# Patient Record
Sex: FEMALE | Race: WHITE | NOT HISPANIC OR LATINO | Employment: OTHER | URBAN - METROPOLITAN AREA
[De-identification: names, ages, dates, MRNs, and addresses within clinical notes are randomized per-mention and may not be internally consistent; named-entity substitution may affect disease eponyms.]

---

## 2017-11-16 ENCOUNTER — CONVERSION ENCOUNTER (OUTPATIENT)
Dept: RADIOLOGY | Facility: IMAGING CENTER | Age: 77
End: 2017-11-16

## 2018-06-26 ENCOUNTER — TRANSCRIBE ORDERS (OUTPATIENT)
Dept: PHYSICAL THERAPY | Facility: CLINIC | Age: 78
End: 2018-06-26

## 2018-06-26 ENCOUNTER — EVALUATION (OUTPATIENT)
Dept: PHYSICAL THERAPY | Facility: CLINIC | Age: 78
End: 2018-06-26
Payer: MEDICARE

## 2018-06-26 DIAGNOSIS — M54.9 DORSALGIA: Primary | ICD-10-CM

## 2018-06-26 PROCEDURE — G8978 MOBILITY CURRENT STATUS: HCPCS | Performed by: PHYSICAL THERAPIST

## 2018-06-26 PROCEDURE — 97014 ELECTRIC STIMULATION THERAPY: CPT | Performed by: PHYSICAL THERAPIST

## 2018-06-26 PROCEDURE — 97163 PT EVAL HIGH COMPLEX 45 MIN: CPT | Performed by: PHYSICAL THERAPIST

## 2018-06-26 PROCEDURE — G8979 MOBILITY GOAL STATUS: HCPCS | Performed by: PHYSICAL THERAPIST

## 2018-06-26 NOTE — LETTER
2018    Nora Zuleta MD  [address] [address]    Patient: David Urrutia   YOB: 1940   Date of Visit: 2018     Encounter Diagnosis     ICD-10-CM    1  Dorsalgia M54 9        Dear Dr Chayo Godinez:    Please review the attached Plan of Care from [facility] recent visit  Please verify that you agree therapy should continue by signing the attached document and sending it back to our office  If you have any questions or concerns, please don't hesitate to call  Sincerely,    Helena Arcos, PT      Referring Provider:      I certify that I have read the below Plan of Care and certify the need for these services furnished under this plan of treatment while under my care  Nora Zuleta MD  [facility] [address] [id number]  VIA Facsimile: [phone]          PT Evaluation     Today's date: 2018  Patient name: David Urrutia  : 1940  MRN: [id number]  Referring provider: Glenna Bennett MD  Dx:   Encounter Diagnosis     ICD-10-CM    1  Dorsalgia M54 9                   Assessment  Impairments: abnormal gait, abnormal muscle firing, abnormal movement, activity intolerance, impaired physical strength, pain with function, safety issue, poor posture  and poor body mechanics    Assessment details: David Urrutia was seen for an initial PT evaluation today  Patient is a [de-identified] y o  female with diagnosis of lumbar pain and past medical history significant for cardiac dysfunction, pneumonia, low bone density  High complexity evaluation  due to number of participation restrictions, functional outcome measure of 66% disability, and unstable/worsening clinical presentation  Findings today show limited lumbar AROM with decreased LE strength and stability as well as limited core strength with pain impacting overall functional mobility and ability to perform ADLs  Skilled PT indicated to treat at this time       Barriers to therapy: Pain  Understanding of Dx/Px/POC: good   Prognosis: good    Goals  STG  1  Patient's lumbar pain will be reported 3/10 pain at rest    2  Improve patient's lumbar flexion to distal knee  for increased ability to take proper strides during ambulation  3  Increase patient's ability to transfer in and OOB with proper log roll mechanics and 50% VC  LTG  1  Patient's LE strength will be equal bilaterally for ability to ambulate and return to functional activities at PLOF  2  Patient will be able to transfer in and OOB with 0/10 pain in lumbar for increased ability to transfer  3  Patient will be independent with home exercise program for continued maintenance post PT discharge  Plan  Patient would benefit from: skilled physical therapy  Planned modality interventions: electrical stimulation/Russian stimulation, cryotherapy, ultrasound and thermotherapy: hydrocollator packs  Planned therapy interventions: abdominal trunk stabilization, activity modification, balance, body mechanics training, manual therapy, patient education, strengthening, stretching, therapeutic activities, therapeutic exercise, transfer training, gait training, flexibility, functional ROM exercises and home exercise program  Frequency: 3x week  Duration in weeks: 8  Plan of Care beginning date: 2018  Plan of Care expiration date: 2018  Treatment plan discussed with: patient        Subjective Evaluation    History of Present Illness  Date of onset: 2018  Mechanism of injury: Patient stated 18 she felt sick with congestion and cough  Went to PCP and was given antibiotics for pneumonia  6 days later she had increased coughing and felt a "crack" in her back  Attempted to sleep that night and had extreme difficulty getting OOB  Went to ED and was admitted for 5 days with Dx of bronchitis  Spinal x-rays (-) for fx  Recently saw PCP who referred her to PT    Not a recurrent problem   Quality of life: good    Pain  Current pain ratin  At best pain ratin  At worst pain rating: 10  Location: LB  Quality: discomfort, cramping, pulling and tight  Relieving factors: relaxation, rest and support  Aggravating factors: sitting, standing, walking, stair climbing and lifting  Progression: worsening    Social Support  Stairs in house: yes     Employment status: not working    Diagnostic Tests  X-ray: normal  Treatments  Previous treatment: home therapy and medication  Current treatment: medication  Discharged from (in last 30 days): inpatient hospitalization and home health care  Patient Goals  Patient goals for therapy: increased motion, decreased pain and independence with ADLs/IADLs          Objective     Special Questions  Positive for disturbed sleep  Postural Observations  Seated posture: fair  Standing posture: fair  Correction of posture: has no consistent effect    Additional Postural Observation Details  Sway back, posterior weight shift    Palpation   Left   No palpable tenderness to the external abdominal oblique  Muscle spasm in the erector spinae, lumbar paraspinals and quadratus lumborum  Tenderness of the erector spinae, lumbar paraspinals and quadratus lumborum  Right   No palpable tenderness to the external abdominal oblique  Muscle spasm in the erector spinae, lumbar paraspinals and quadratus lumborum  Tenderness of the erector spinae, lumbar paraspinals and quadratus lumborum  Tenderness     Left Hip   Tenderness in the PSIS  Right Hip   Tenderness in the PSIS  Neurological Testing     Sensation     Lumbar   Left   Intact: light touch    Right   Intact: light touch    Active Range of Motion     Lumbar   Flexion: Active lumbar flexion: to proximal knee  Extension: 12 degrees   Left lateral flexion: 13 degrees   Right lateral flexion: 14 degrees     Additional Active Range of Motion Details  Repetitive lumbar flexion increase pain, repetitive lumbar extension decrease/stabilized pain       Strength/Myotome Testing Left Hip   Planes of Motion   Flexion: 4    Right Hip   Planes of Motion   Flexion: 4-    Left Knee   Flexion: 4+  Extension: 4+    Right Knee   Flexion: 4  Extension: 4+    Left Ankle/Foot   Dorsiflexion: 4    Right Ankle/Foot   Dorsiflexion: 4    Muscle Activation     Additional Muscle Activation Details  Min limited TrA activation bilateral with resisted supine SLR  Tests     Lumbar   Positive repeated flexion  Negative repeated extension  Additional Tests Details  90/90 SLR hamstring tightness right=(43deg) left=(38deg)    Ambulation   Weight-Bearing Status   Weight-Bearing Status (Left): full weight bearing   Distance in feet: 500  Assistive device used: none    Ambulation: Level Surfaces   Ambulation without assistive device: independent    Observational Gait   Gait: antalgic and asymmetric   Decreased walking speed and stride length  Left arm swing: decreased  Right arm swing: decreased    Additional Observational Gait Details  Lumbar spine kept in slight extension with sway back posture  Decreased velocity with short step length, shuffle gait  Quality of Movement During Gait   Trunk  Posterior lean  General Comments     Lumbar Comments  FOTO Lumbar Functional Status 66% disability         Flowsheet Rows      Most Recent Value   PT/OT G-Codes   FOTO information reviewed  N/A [FOTO lumbar functional status short form 66% disability]   Assessment Type  Evaluation   G code set  Mobility: Walking & Moving Around   Mobility: Walking and Moving Around Current Status ()  CL   Mobility: Walking and Moving Around Goal Status ()  CK          Precautions:     Re-evaluation: 7/26/18    Specialty Daily Treatment Diary     Manual  6/26/18       STM/DTM                                            Exercise Diary  6/26/18       Sit <> supine mechanics 10 min       Core brace        Supine ball squeeze        Supine clamshell        LTR        Hamstring stretch        Seated QL stretch Modalities 6/26/18       Seated lumbar IFC 10 min with heat

## 2018-06-26 NOTE — PROGRESS NOTES
PT Evaluation     Today's date: 2018  Patient name: Joanna Gonzales  : 1940  MRN: [id number]  Referring provider: Kumar Guillen MD  Dx:   Encounter Diagnosis     ICD-10-CM    1  Dorsalgia M54 9                   Assessment  Impairments: abnormal gait, abnormal muscle firing, abnormal movement, activity intolerance, impaired physical strength, pain with function, safety issue, poor posture  and poor body mechanics    Assessment details: Joanna Gonzales was seen for an initial PT evaluation today  Patient is a [de-identified] y o  female with diagnosis of lumbar pain and past medical history significant for cardiac dysfunction, pneumonia, low bone density  High complexity evaluation  due to number of participation restrictions, functional outcome measure of 66% disability, and unstable/worsening clinical presentation  Findings today show limited lumbar AROM with decreased LE strength and stability as well as limited core strength with pain impacting overall functional mobility and ability to perform ADLs  Skilled PT indicated to treat at this time  Barriers to therapy: Pain  Understanding of Dx/Px/POC: good   Prognosis: good    Goals  STG  1  Patient's lumbar pain will be reported 3/10 pain at rest    2  Improve patient's lumbar flexion to distal knee  for increased ability to take proper strides during ambulation  3  Increase patient's ability to transfer in and OOB with proper log roll mechanics and 50% VC  LTG  1  Patient's LE strength will be equal bilaterally for ability to ambulate and return to functional activities at PLOF  2  Patient will be able to transfer in and OOB with 0/10 pain in lumbar for increased ability to transfer  3  Patient will be independent with home exercise program for continued maintenance post PT discharge         Plan  Patient would benefit from: skilled physical therapy  Planned modality interventions: electrical stimulation/Russian stimulation, cryotherapy, ultrasound and thermotherapy: hydrocollator packs  Planned therapy interventions: abdominal trunk stabilization, activity modification, balance, body mechanics training, manual therapy, patient education, strengthening, stretching, therapeutic activities, therapeutic exercise, transfer training, gait training, flexibility, functional ROM exercises and home exercise program  Frequency: 3x week  Duration in weeks: 8  Plan of Care beginning date: 2018  Plan of Care expiration date: 2018  Treatment plan discussed with: patient        Subjective Evaluation    History of Present Illness  Date of onset: 2018  Mechanism of injury: Patient stated 18 she felt sick with congestion and cough  Went to PCP and was given antibiotics for pneumonia  6 days later she had increased coughing and felt a "crack" in her back  Attempted to sleep that night and had extreme difficulty getting OOB  Went to ED and was admitted for 5 days with Dx of bronchitis  Spinal x-rays (-) for fx  Recently saw PCP who referred her to PT  Not a recurrent problem   Quality of life: good    Pain  Current pain ratin  At best pain ratin  At worst pain rating: 10  Location: LB  Quality: discomfort, cramping, pulling and tight  Relieving factors: relaxation, rest and support  Aggravating factors: sitting, standing, walking, stair climbing and lifting  Progression: worsening    Social Support  Stairs in house: yes     Employment status: not working    Diagnostic Tests  X-ray: normal  Treatments  Previous treatment: home therapy and medication  Current treatment: medication  Discharged from (in last 30 days): inpatient hospitalization and home health care  Patient Goals  Patient goals for therapy: increased motion, decreased pain and independence with ADLs/IADLs          Objective     Special Questions  Positive for disturbed sleep       Postural Observations  Seated posture: fair  Standing posture: fair  Correction of posture: has no consistent effect    Additional Postural Observation Details  Sway back, posterior weight shift    Palpation   Left   No palpable tenderness to the external abdominal oblique  Muscle spasm in the erector spinae, lumbar paraspinals and quadratus lumborum  Tenderness of the erector spinae, lumbar paraspinals and quadratus lumborum  Right   No palpable tenderness to the external abdominal oblique  Muscle spasm in the erector spinae, lumbar paraspinals and quadratus lumborum  Tenderness of the erector spinae, lumbar paraspinals and quadratus lumborum  Tenderness     Left Hip   Tenderness in the PSIS  Right Hip   Tenderness in the PSIS  Neurological Testing     Sensation     Lumbar   Left   Intact: light touch    Right   Intact: light touch    Active Range of Motion     Lumbar   Flexion: Active lumbar flexion: to proximal knee  Extension: 12 degrees   Left lateral flexion: 13 degrees   Right lateral flexion: 14 degrees     Additional Active Range of Motion Details  Repetitive lumbar flexion increase pain, repetitive lumbar extension decrease/stabilized pain  Strength/Myotome Testing     Left Hip   Planes of Motion   Flexion: 4    Right Hip   Planes of Motion   Flexion: 4-    Left Knee   Flexion: 4+  Extension: 4+    Right Knee   Flexion: 4  Extension: 4+    Left Ankle/Foot   Dorsiflexion: 4    Right Ankle/Foot   Dorsiflexion: 4    Muscle Activation     Additional Muscle Activation Details  Min limited TrA activation bilateral with resisted supine SLR  Tests     Lumbar   Positive repeated flexion  Negative repeated extension       Additional Tests Details  90/90 SLR hamstring tightness right=(43deg) left=(38deg)    Ambulation   Weight-Bearing Status   Weight-Bearing Status (Left): full weight bearing   Distance in feet: 500  Assistive device used: none    Ambulation: Level Surfaces   Ambulation without assistive device: independent    Observational Gait   Gait: antalgic and asymmetric   Decreased walking speed and stride length  Left arm swing: decreased  Right arm swing: decreased    Additional Observational Gait Details  Lumbar spine kept in slight extension with sway back posture  Decreased velocity with short step length, shuffle gait  Quality of Movement During Gait   Trunk  Posterior lean  General Comments     Lumbar Comments  FOTO Lumbar Functional Status 66% disability         Flowsheet Rows      Most Recent Value   PT/OT G-Codes   FOTO information reviewed  N/A [FOTO lumbar functional status short form 66% disability]   Assessment Type  Evaluation   G code set  Mobility: Walking & Moving Around   Mobility: Walking and Moving Around Current Status ()  CL   Mobility: Walking and Moving Around Goal Status ()  CK          Precautions:     Re-evaluation: 7/26/18    Specialty Daily Treatment Diary     Manual  6/26/18       STM/DTM                                            Exercise Diary  6/26/18       Sit <> supine mechanics 10 min       Core brace        Supine ball squeeze        Supine clamshell        LTR        Hamstring stretch        Seated QL stretch                                                                                                                    Modalities 6/26/18       Seated lumbar IFC 10 min with heat

## 2018-06-28 ENCOUNTER — OFFICE VISIT (OUTPATIENT)
Dept: PHYSICAL THERAPY | Facility: CLINIC | Age: 78
End: 2018-06-28
Payer: MEDICARE

## 2018-06-28 DIAGNOSIS — M54.9 DORSALGIA: Primary | ICD-10-CM

## 2018-06-28 PROCEDURE — 97140 MANUAL THERAPY 1/> REGIONS: CPT | Performed by: PHYSICAL THERAPIST

## 2018-06-28 PROCEDURE — 97110 THERAPEUTIC EXERCISES: CPT | Performed by: PHYSICAL THERAPIST

## 2018-06-28 PROCEDURE — 97014 ELECTRIC STIMULATION THERAPY: CPT | Performed by: PHYSICAL THERAPIST

## 2018-06-28 PROCEDURE — 97112 NEUROMUSCULAR REEDUCATION: CPT | Performed by: PHYSICAL THERAPIST

## 2018-06-28 NOTE — PROGRESS NOTES
Daily Note     Today's date: 2018  Patient name: Livan Butt  : 1940  MRN: [id number]  Referring provider: Kiah Haile MD  Dx:   Encounter Diagnosis     ICD-10-CM    1  Dorsalgia M54 9                   Subjective: Patient states she is doing "okay"  PQ today 5/10  Usually takes acetaminophen daily, but didn't yesterday and states she "didn't feel good"  Objective: See treatment diary below    Re-evaluation: 18     Specialty Daily Treatment Diary      Manual  18         STM/DTM to mid and LB PSM, TLF, QLs bilat    15 min                                                                       Exercise Diary  18         Sit <> supine mechanics 10 min  5 min         Nustep (seat11, arms 7)  L1, 5 min with heat to LB       Core brace    10x :05         Supine ball squeeze    10x:05         Supine clamshell iso    10x:05         LTR    20x         Hamstring stretch (seated)   3x:15         Seated QL stretch    4x:15          Knee fall out with brace   10x bilat                                                                                                                                                                                        Modalities 18         Seated lumbar IFC 10 min with heat  10 min                                           Assessment: Swayback posture with standing and walking possibly causing increased stress on L/S contributing to Sx and pain  Needs to work on core stability, proper transfer mechanics, and hamstring flexibility  Plan: Continue per plan of care

## 2018-07-02 ENCOUNTER — OFFICE VISIT (OUTPATIENT)
Dept: PHYSICAL THERAPY | Facility: CLINIC | Age: 78
End: 2018-07-02
Payer: MEDICARE

## 2018-07-02 DIAGNOSIS — M54.9 DORSALGIA: Primary | ICD-10-CM

## 2018-07-02 PROCEDURE — 97014 ELECTRIC STIMULATION THERAPY: CPT | Performed by: PHYSICAL THERAPIST

## 2018-07-02 PROCEDURE — 97112 NEUROMUSCULAR REEDUCATION: CPT | Performed by: PHYSICAL THERAPIST

## 2018-07-02 PROCEDURE — 97140 MANUAL THERAPY 1/> REGIONS: CPT | Performed by: PHYSICAL THERAPIST

## 2018-07-02 PROCEDURE — 97110 THERAPEUTIC EXERCISES: CPT | Performed by: PHYSICAL THERAPIST

## 2018-07-02 NOTE — PROGRESS NOTES
Daily Note     Today's date: 2018  Patient name: Jesenia Andrew  : 1940  MRN: [id number]  Referring provider: Keisha Horowitz MD  Dx:   Encounter Diagnosis     ICD-10-CM    1  Dorsalgia M54 9                   Subjective: Patient states she is "not doing that great today"  Feels stiff pain level 4/10  Did take tylenol OA at 9am today  Continues to have increased pain with sit<>supine transfers  Does feel improvement with decreased back pain while bending forward picking an item off the floor  Objective: See treatment diary below    Re-evaluation: 18     Specialty Daily Treatment Diary      Manual  18       STM/DTM to mid and LB PSM, TLF, QLs bilat    15 min  15 min                                                                     Exercise Diary  18       Sit <> supine mechanics 10 min  5 min  5 min       Nustep (seat11, arms 7)   L1, 5 min with heat to LB   L1, 8 min with heat to LB       Core brace    10x :05  10x :05       Supine ball squeeze    10x:05  10x :05       Supine clamshell iso    10x:05  10x :05       LTR    20x  20x       Hamstring stretch (seated)   3x:15  3x :30       Seated QL stretch    4x:15  4x :15        Knee fall out with brace   10x bilat   10x        Supine heel slide with brace      10x bilat       romberg balance              Fitter balance fwd, lat                                                                                                                                                 Modalities 18       Seated lumbar IFC 10 min with heat  10 min  10 min                                       Assessment: Slow, uncoordinated supine<>sit transfers, but has improved with log roll technique since IE  Noted moderate QL and PSM tightness bilaterally possibly contributing to Sx at this time  Needs to work on more dynamic core control for functional core stability  Plan: Continue per plan of care

## 2018-07-03 ENCOUNTER — OFFICE VISIT (OUTPATIENT)
Dept: PHYSICAL THERAPY | Facility: CLINIC | Age: 78
End: 2018-07-03
Payer: MEDICARE

## 2018-07-03 DIAGNOSIS — M54.9 DORSALGIA: Primary | ICD-10-CM

## 2018-07-03 PROCEDURE — G8980 MOBILITY D/C STATUS: HCPCS | Performed by: PHYSICAL THERAPIST

## 2018-07-03 PROCEDURE — G8979 MOBILITY GOAL STATUS: HCPCS | Performed by: PHYSICAL THERAPIST

## 2018-07-03 PROCEDURE — 97110 THERAPEUTIC EXERCISES: CPT

## 2018-07-03 PROCEDURE — 97140 MANUAL THERAPY 1/> REGIONS: CPT

## 2018-07-03 PROCEDURE — 97014 ELECTRIC STIMULATION THERAPY: CPT

## 2018-07-03 NOTE — PROGRESS NOTES
Daily Note     Today's date: 7/3/2018  Patient name: Silvano Garay  : 1940  MRN: [id number]  Referring provider: Delon Corral MD  Dx:   Encounter Diagnosis     ICD-10-CM    1  Dorsalgia M54 9                   Subjective: Patient rates pin in LB 4/10 today  She feels the heat outside has been bothering her  She states she becomes a little sore with the exercises, but it subsides with pain medication and rest  Also she reports she does her exercises regularly at home  Objective: See treatment diary below  Re-evaluation: 18     Specialty Daily Treatment Diary      Manual  6/26/18  6/28/18  7/2/18  7/3/18     STM/DTM to mid and LB PSM, TLF, QLs bilat    15 min  15 min  15 min                                                                   Exercise Diary  6/26/18  6/28/18  7/2/18  7/3/18     Sit <> supine mechanics 10 min  5 min  5 min  5 min     Nustep (seat11, arms 7)   L1, 5 min with heat to LB   L1, 8 min with heat to LB  arms=8  L1 x9 min with LB heat     Core brace    10x :05  10x :05  15x :05     Supine ball squeeze    10x:05  10x :05  15x :05     Supine clamshell iso    10x:05  10x :05  15x :05     LTR    20x  20x  25x     Hamstring stretch (seated)   3x:15  3x :30  3 x :30     Seated QL stretch    4x:15  4x :15  4x :20 B/L lean      Knee fall out with brace   10x bilat   10x   15x B/L     Supine heel slide with brace      10x bilat  15x B/L     Romberg EO/EC  balance        NBS   30 sec x 3      Fitter balance fwd, lat        x10 each                                                                                                                                         Modalities 6/26/18  6/28/18  7/2/18  7/3/18     Seated lumbar IFC 10 min with heat  10 min  10 min  10 min                                     Assessment: Tolerated treatment well  Patient demonstrated fatigue post treatment and would benefit from continued PT  Patient needed minimal verbal cues for instruction        Plan: Continue per plan of care  Progress treatment as tolerated

## 2018-07-06 ENCOUNTER — APPOINTMENT (OUTPATIENT)
Dept: PHYSICAL THERAPY | Facility: CLINIC | Age: 78
End: 2018-07-06
Payer: MEDICARE

## 2018-07-10 ENCOUNTER — APPOINTMENT (OUTPATIENT)
Dept: PHYSICAL THERAPY | Facility: CLINIC | Age: 78
End: 2018-07-10
Payer: MEDICARE

## 2018-07-12 ENCOUNTER — APPOINTMENT (OUTPATIENT)
Dept: PHYSICAL THERAPY | Facility: CLINIC | Age: 78
End: 2018-07-12
Payer: MEDICARE

## 2018-11-15 ENCOUNTER — TRANSCRIBE ORDERS (OUTPATIENT)
Dept: ADMINISTRATIVE | Facility: HOSPITAL | Age: 78
End: 2018-11-15

## 2018-11-15 DIAGNOSIS — Z12.31 VISIT FOR SCREENING MAMMOGRAM: Primary | ICD-10-CM

## 2018-11-26 ENCOUNTER — HOSPITAL ENCOUNTER (OUTPATIENT)
Dept: RADIOLOGY | Facility: IMAGING CENTER | Age: 78
Discharge: HOME | End: 2018-11-26
Payer: MEDICARE

## 2018-11-26 VITALS — BODY MASS INDEX: 19.49 KG/M2 | HEIGHT: 63 IN | WEIGHT: 110 LBS

## 2018-11-26 DIAGNOSIS — Z12.31 VISIT FOR SCREENING MAMMOGRAM: ICD-10-CM

## 2018-11-26 PROCEDURE — 77067 SCR MAMMO BI INCL CAD: CPT

## 2019-02-28 ENCOUNTER — HOSPITAL ENCOUNTER (OUTPATIENT)
Dept: RADIOLOGY | Facility: IMAGING CENTER | Age: 79
Discharge: HOME | End: 2019-02-28
Payer: MEDICARE

## 2019-02-28 ENCOUNTER — TRANSCRIBE ORDERS (OUTPATIENT)
Dept: ADMINISTRATIVE | Facility: HOSPITAL | Age: 79
End: 2019-02-28

## 2019-02-28 DIAGNOSIS — R05.9 COUGH: ICD-10-CM

## 2019-02-28 DIAGNOSIS — R05.9 COUGH: Primary | ICD-10-CM

## 2019-02-28 PROCEDURE — 71046 X-RAY EXAM CHEST 2 VIEWS: CPT

## 2019-04-11 RX ORDER — IBANDRONATE SODIUM 150 MG/1
150 TABLET, FILM COATED ORAL
COMMUNITY

## 2019-04-11 RX ORDER — NIACIN 500 MG/1
500 TABLET, EXTENDED RELEASE ORAL DAILY
COMMUNITY
Start: 2015-07-14

## 2019-04-11 RX ORDER — SIMVASTATIN 40 MG
40 TABLET ORAL
COMMUNITY
Start: 2015-04-23

## 2019-04-12 ENCOUNTER — ANESTHESIA EVENT (OUTPATIENT)
Dept: GASTROENTEROLOGY | Facility: HOSPITAL | Age: 79
End: 2019-04-12
Payer: MEDICARE

## 2019-04-15 ENCOUNTER — HOSPITAL ENCOUNTER (OUTPATIENT)
Facility: HOSPITAL | Age: 79
Setting detail: OUTPATIENT SURGERY
Discharge: HOME | End: 2019-04-15
Attending: COLON & RECTAL SURGERY | Admitting: COLON & RECTAL SURGERY
Payer: MEDICARE

## 2019-04-15 ENCOUNTER — ANESTHESIA (OUTPATIENT)
Dept: GASTROENTEROLOGY | Facility: HOSPITAL | Age: 79
End: 2019-04-15
Payer: MEDICARE

## 2019-04-15 VITALS
SYSTOLIC BLOOD PRESSURE: 118 MMHG | HEIGHT: 61 IN | HEART RATE: 75 BPM | DIASTOLIC BLOOD PRESSURE: 56 MMHG | TEMPERATURE: 100.3 F | BODY MASS INDEX: 20.39 KG/M2 | OXYGEN SATURATION: 96 % | WEIGHT: 108 LBS | RESPIRATION RATE: 14 BRPM

## 2019-04-15 RX ORDER — OMEGA-3S/DHA/EPA/FISH OIL/D3 300MG-1000
400 CAPSULE ORAL DAILY
COMMUNITY

## 2019-04-15 RX ORDER — ONDANSETRON 2 MG/ML
4 INJECTION INTRAMUSCULAR; INTRAVENOUS ONCE AS NEEDED
Status: DISCONTINUED | OUTPATIENT
Start: 2019-04-15 | End: 2019-04-15 | Stop reason: HOSPADM

## 2019-04-15 RX ORDER — CLOPIDOGREL BISULFATE 75 MG/1
75 TABLET ORAL DAILY
COMMUNITY

## 2019-04-15 RX ORDER — ASPIRIN 81 MG/1
81 TABLET ORAL DAILY
COMMUNITY

## 2019-04-15 RX ORDER — SODIUM CHLORIDE 9 MG/ML
125 INJECTION, SOLUTION INTRAVENOUS CONTINUOUS
Status: DISCONTINUED | OUTPATIENT
Start: 2019-04-15 | End: 2019-04-15 | Stop reason: HOSPADM

## 2019-04-15 RX ORDER — SENNOSIDES 8.6 MG
650 CAPSULE ORAL EVERY 8 HOURS PRN
COMMUNITY

## 2019-04-15 RX ORDER — LANOLIN ALCOHOL/MO/W.PET/CERES
1 CREAM (GRAM) TOPICAL 2 TIMES DAILY
COMMUNITY

## 2019-04-15 RX ORDER — VITAMIN E 268 MG
400 CAPSULE ORAL DAILY
COMMUNITY

## 2019-04-15 RX ORDER — PROPOFOL 10 MG/ML
INJECTION, EMULSION INTRAVENOUS AS NEEDED
Status: DISCONTINUED | OUTPATIENT
Start: 2019-04-15 | End: 2019-04-15 | Stop reason: SURG

## 2019-04-15 RX ORDER — CALCIUM POLYCARBOPHIL 625 MG 625 MG/1
625 TABLET ORAL DAILY
COMMUNITY

## 2019-04-15 RX ORDER — OXYMETAZOLINE HYDROCHLORIDE 0.05 G/100ML
2 SPRAY NASAL 2 TIMES DAILY
COMMUNITY

## 2019-04-15 RX ORDER — MECLIZINE HCL 12.5 MG/1
12.5 TABLET ORAL 3 TIMES DAILY PRN
COMMUNITY

## 2019-04-15 RX ORDER — B-COMPLEX WITH VITAMIN C
1 TABLET ORAL
COMMUNITY

## 2019-04-15 RX ADMIN — PROPOFOL 50 MG: 10 INJECTION, EMULSION INTRAVENOUS at 11:18

## 2019-04-15 RX ADMIN — SODIUM CHLORIDE 125 ML/HR: 0.9 INJECTION, SOLUTION INTRAVENOUS at 11:00

## 2019-04-15 RX ADMIN — PROPOFOL 100 MG: 10 INJECTION, EMULSION INTRAVENOUS at 11:15

## 2019-04-15 RX ADMIN — PROPOFOL 50 MG: 10 INJECTION, EMULSION INTRAVENOUS at 11:30

## 2019-04-15 RX ADMIN — PROPOFOL 50 MG: 10 INJECTION, EMULSION INTRAVENOUS at 11:38

## 2019-04-15 RX ADMIN — GLUCAGON HYDROCHLORIDE 1 MG: KIT at 11:20

## 2019-04-15 RX ADMIN — PROPOFOL 50 MG: 10 INJECTION, EMULSION INTRAVENOUS at 11:20

## 2019-04-15 RX ADMIN — PROPOFOL 50 MG: 10 INJECTION, EMULSION INTRAVENOUS at 11:25

## 2019-04-15 RX ADMIN — PROPOFOL 50 MG: 10 INJECTION, EMULSION INTRAVENOUS at 11:34

## 2019-05-03 ENCOUNTER — TRANSCRIBE ORDERS (OUTPATIENT)
Dept: ADMINISTRATIVE | Facility: HOSPITAL | Age: 79
End: 2019-05-03

## 2019-05-03 DIAGNOSIS — M81.0 SENILE OSTEOPOROSIS: Primary | ICD-10-CM

## 2019-05-06 ENCOUNTER — HOSPITAL ENCOUNTER (OUTPATIENT)
Dept: RADIOLOGY | Facility: IMAGING CENTER | Age: 79
Discharge: HOME | End: 2019-05-06
Payer: MEDICARE

## 2019-05-06 DIAGNOSIS — M81.0 SENILE OSTEOPOROSIS: ICD-10-CM

## 2019-05-06 PROCEDURE — 77080 DXA BONE DENSITY AXIAL: CPT

## 2019-11-13 ENCOUNTER — TRANSCRIBE ORDERS (OUTPATIENT)
Dept: ADMINISTRATIVE | Facility: HOSPITAL | Age: 79
End: 2019-11-13

## 2019-11-13 DIAGNOSIS — Z12.31 ENCOUNTER FOR SCREENING MAMMOGRAM FOR BREAST CANCER: Primary | ICD-10-CM

## 2019-11-19 ENCOUNTER — HOSPITAL ENCOUNTER (OUTPATIENT)
Dept: RADIOLOGY | Facility: IMAGING CENTER | Age: 79
Discharge: HOME | End: 2019-11-19
Payer: MEDICARE

## 2019-11-19 VITALS — BODY MASS INDEX: 20.77 KG/M2 | WEIGHT: 110 LBS | HEIGHT: 61 IN

## 2019-11-19 DIAGNOSIS — Z12.31 ENCOUNTER FOR SCREENING MAMMOGRAM FOR BREAST CANCER: ICD-10-CM

## 2019-11-19 PROCEDURE — 77067 SCR MAMMO BI INCL CAD: CPT

## 2020-07-20 ENCOUNTER — APPOINTMENT (OUTPATIENT)
Dept: LAB | Facility: CLINIC | Age: 80
End: 2020-07-20
Payer: MEDICARE

## 2020-07-20 ENCOUNTER — TRANSCRIBE ORDERS (OUTPATIENT)
Dept: URGENT CARE | Facility: CLINIC | Age: 80
End: 2020-07-20

## 2020-07-20 DIAGNOSIS — I10 ESSENTIAL HYPERTENSION: ICD-10-CM

## 2020-07-20 DIAGNOSIS — M81.0 SENILE OSTEOPOROSIS: ICD-10-CM

## 2020-07-20 DIAGNOSIS — E78.2 MIXED HYPERLIPIDEMIA: Primary | ICD-10-CM

## 2020-07-20 DIAGNOSIS — E78.2 MIXED HYPERLIPIDEMIA: ICD-10-CM

## 2020-07-20 LAB
25(OH)D3 SERPL-MCNC: 31.8 NG/ML (ref 30–100)
ALBUMIN SERPL BCP-MCNC: 3.6 G/DL (ref 3.5–5)
ALP SERPL-CCNC: 42 U/L (ref 46–116)
ALT SERPL W P-5'-P-CCNC: 26 U/L (ref 12–78)
ANION GAP SERPL CALCULATED.3IONS-SCNC: 5 MMOL/L (ref 4–13)
AST SERPL W P-5'-P-CCNC: 22 U/L (ref 5–45)
BASOPHILS # BLD AUTO: 0.06 THOUSANDS/ΜL (ref 0–0.1)
BASOPHILS NFR BLD AUTO: 1 % (ref 0–1)
BILIRUB SERPL-MCNC: 0.59 MG/DL (ref 0.2–1)
BUN SERPL-MCNC: 15 MG/DL (ref 5–25)
CALCIUM SERPL-MCNC: 9 MG/DL (ref 8.3–10.1)
CHLORIDE SERPL-SCNC: 108 MMOL/L (ref 100–108)
CHOLEST SERPL-MCNC: 145 MG/DL (ref 50–200)
CO2 SERPL-SCNC: 27 MMOL/L (ref 21–32)
CREAT SERPL-MCNC: 0.65 MG/DL (ref 0.6–1.3)
EOSINOPHIL # BLD AUTO: 0.34 THOUSAND/ΜL (ref 0–0.61)
EOSINOPHIL NFR BLD AUTO: 6 % (ref 0–6)
ERYTHROCYTE [DISTWIDTH] IN BLOOD BY AUTOMATED COUNT: 13 % (ref 11.6–15.1)
GFR SERPL CREATININE-BSD FRML MDRD: 83 ML/MIN/1.73SQ M
GLUCOSE P FAST SERPL-MCNC: 95 MG/DL (ref 65–99)
HCT VFR BLD AUTO: 38.6 % (ref 34.8–46.1)
HDLC SERPL-MCNC: 37 MG/DL
HGB BLD-MCNC: 12.3 G/DL (ref 11.5–15.4)
IMM GRANULOCYTES # BLD AUTO: 0.01 THOUSAND/UL (ref 0–0.2)
IMM GRANULOCYTES NFR BLD AUTO: 0 % (ref 0–2)
LDLC SERPL CALC-MCNC: 77 MG/DL (ref 0–100)
LYMPHOCYTES # BLD AUTO: 2.63 THOUSANDS/ΜL (ref 0.6–4.47)
LYMPHOCYTES NFR BLD AUTO: 43 % (ref 14–44)
MCH RBC QN AUTO: 31.9 PG (ref 26.8–34.3)
MCHC RBC AUTO-ENTMCNC: 31.9 G/DL (ref 31.4–37.4)
MCV RBC AUTO: 100 FL (ref 82–98)
MONOCYTES # BLD AUTO: 0.68 THOUSAND/ΜL (ref 0.17–1.22)
MONOCYTES NFR BLD AUTO: 11 % (ref 4–12)
NEUTROPHILS # BLD AUTO: 2.35 THOUSANDS/ΜL (ref 1.85–7.62)
NEUTS SEG NFR BLD AUTO: 39 % (ref 43–75)
NONHDLC SERPL-MCNC: 108 MG/DL
NRBC BLD AUTO-RTO: 0 /100 WBCS
PLATELET # BLD AUTO: 194 THOUSANDS/UL (ref 149–390)
PMV BLD AUTO: 9.8 FL (ref 8.9–12.7)
POTASSIUM SERPL-SCNC: 3.9 MMOL/L (ref 3.5–5.3)
PROT SERPL-MCNC: 7.8 G/DL (ref 6.4–8.2)
RBC # BLD AUTO: 3.85 MILLION/UL (ref 3.81–5.12)
SODIUM SERPL-SCNC: 140 MMOL/L (ref 136–145)
TRIGL SERPL-MCNC: 153 MG/DL
WBC # BLD AUTO: 6.07 THOUSAND/UL (ref 4.31–10.16)

## 2020-07-20 PROCEDURE — 85025 COMPLETE CBC W/AUTO DIFF WBC: CPT | Performed by: FAMILY MEDICINE

## 2020-07-20 PROCEDURE — 80053 COMPREHEN METABOLIC PANEL: CPT

## 2020-07-20 PROCEDURE — 82306 VITAMIN D 25 HYDROXY: CPT

## 2020-07-20 PROCEDURE — 36415 COLL VENOUS BLD VENIPUNCTURE: CPT | Performed by: FAMILY MEDICINE

## 2020-07-20 PROCEDURE — 80061 LIPID PANEL: CPT

## 2020-07-23 ENCOUNTER — HOSPITAL ENCOUNTER (OUTPATIENT)
Dept: MRI IMAGING | Facility: HOSPITAL | Age: 80
Discharge: HOME | End: 2020-07-23
Payer: MEDICARE

## 2020-07-23 DIAGNOSIS — R42 DIZZINESS: ICD-10-CM

## 2020-07-23 DIAGNOSIS — IMO0001 ASYMMETRICAL HEARING LOSS OF RIGHT EAR: ICD-10-CM

## 2020-07-23 PROCEDURE — A9585 GADOBUTROL INJECTION: HCPCS | Performed by: SPECIALIST

## 2020-07-23 PROCEDURE — 70553 MRI BRAIN STEM W/O & W/DYE: CPT

## 2020-07-23 RX ADMIN — GADOBUTROL 5 ML: 604.72 INJECTION INTRAVENOUS at 09:02

## 2020-10-26 ENCOUNTER — CONSULT (OUTPATIENT)
Dept: NEUROLOGY | Facility: CLINIC | Age: 80
End: 2020-10-26
Payer: MEDICARE

## 2020-10-26 VITALS
DIASTOLIC BLOOD PRESSURE: 79 MMHG | RESPIRATION RATE: 14 BRPM | HEART RATE: 66 BPM | BODY MASS INDEX: 20.96 KG/M2 | SYSTOLIC BLOOD PRESSURE: 190 MMHG | TEMPERATURE: 97.1 F | HEIGHT: 61 IN | WEIGHT: 111 LBS

## 2020-10-26 DIAGNOSIS — R42 DIZZINESS: ICD-10-CM

## 2020-10-26 DIAGNOSIS — R90.89 MAGNETIC RESONANCE IMAGING OF BRAIN ABNORMAL: Primary | ICD-10-CM

## 2020-10-26 DIAGNOSIS — R93.0 ABNORMAL FINDINGS ON DIAGNOSTIC IMAGING OF SKULL AND HEAD, NOT ELSEWHERE CLASSIFIED: ICD-10-CM

## 2020-10-26 DIAGNOSIS — G08 TRANSVERSE SINUS THROMBOSIS: ICD-10-CM

## 2020-10-26 PROCEDURE — 99213 OFFICE O/P EST LOW 20 MIN: CPT | Performed by: PSYCHIATRY & NEUROLOGY

## 2020-11-09 ENCOUNTER — HOSPITAL ENCOUNTER (OUTPATIENT)
Dept: MRI IMAGING | Facility: HOSPITAL | Age: 80
Discharge: HOME | End: 2020-11-09
Attending: PSYCHIATRY & NEUROLOGY
Payer: MEDICARE

## 2020-11-09 DIAGNOSIS — R93.0 ABNORMAL FINDINGS ON DIAGNOSTIC IMAGING OF SKULL AND HEAD, NOT ELSEWHERE CLASSIFIED: ICD-10-CM

## 2020-11-09 DIAGNOSIS — R90.89 MAGNETIC RESONANCE IMAGING OF BRAIN ABNORMAL: ICD-10-CM

## 2020-11-09 PROCEDURE — 70544 MR ANGIOGRAPHY HEAD W/O DYE: CPT

## 2020-11-09 PROCEDURE — G1004 CDSM NDSC: HCPCS

## 2020-11-17 ENCOUNTER — TELEMEDICINE (OUTPATIENT)
Dept: NEUROLOGY | Facility: CLINIC | Age: 80
End: 2020-11-17
Payer: MEDICARE

## 2020-11-17 DIAGNOSIS — R90.89 MAGNETIC RESONANCE IMAGING OF BRAIN ABNORMAL: Primary | ICD-10-CM

## 2020-11-17 PROCEDURE — 99442 PR PHYS/QHP TELEPHONE EVALUATION 11-20 MIN: CPT | Performed by: NURSE PRACTITIONER

## 2020-11-19 ENCOUNTER — TRANSCRIBE ORDERS (OUTPATIENT)
Dept: ADMINISTRATIVE | Facility: HOSPITAL | Age: 80
End: 2020-11-19

## 2020-11-19 DIAGNOSIS — Z12.31 ENCOUNTER FOR SCREENING MAMMOGRAM FOR MALIGNANT NEOPLASM OF BREAST: Primary | ICD-10-CM

## 2020-11-24 ENCOUNTER — HOSPITAL ENCOUNTER (OUTPATIENT)
Dept: RADIOLOGY | Facility: IMAGING CENTER | Age: 80
Discharge: HOME | End: 2020-11-24
Payer: MEDICARE

## 2020-11-24 VITALS — BODY MASS INDEX: 21.14 KG/M2 | WEIGHT: 112 LBS | HEIGHT: 61 IN

## 2020-11-24 DIAGNOSIS — Z12.31 ENCOUNTER FOR SCREENING MAMMOGRAM FOR MALIGNANT NEOPLASM OF BREAST: ICD-10-CM

## 2020-11-24 PROCEDURE — 77067 SCR MAMMO BI INCL CAD: CPT

## 2020-12-31 ENCOUNTER — TELEPHONE (OUTPATIENT)
Dept: NEUROLOGY | Facility: CLINIC | Age: 80
End: 2020-12-31

## 2022-07-13 ENCOUNTER — APPOINTMENT (OUTPATIENT)
Dept: RADIOLOGY | Facility: IMAGING CENTER | Age: 82
End: 2022-07-13
Payer: MEDICARE

## 2022-07-13 ENCOUNTER — HOSPITAL ENCOUNTER (OUTPATIENT)
Dept: RADIOLOGY | Facility: IMAGING CENTER | Age: 82
Discharge: HOME | End: 2022-07-13
Payer: MEDICARE

## 2022-07-13 VITALS — WEIGHT: 110 LBS | HEIGHT: 61 IN | BODY MASS INDEX: 20.77 KG/M2

## 2022-07-13 DIAGNOSIS — Z12.31 SCREENING MAMMOGRAM FOR BREAST CANCER: ICD-10-CM

## 2022-07-13 PROCEDURE — 77067 SCR MAMMO BI INCL CAD: CPT

## 2022-07-13 PROCEDURE — 77063 BREAST TOMOSYNTHESIS BI: CPT

## 2022-07-14 ENCOUNTER — HOSPITAL ENCOUNTER (OUTPATIENT)
Dept: RADIOLOGY | Facility: IMAGING CENTER | Age: 82
Discharge: HOME | End: 2022-07-14
Payer: MEDICARE

## 2022-07-14 DIAGNOSIS — M81.0 OSTEOPOROSIS WITHOUT CURRENT PATHOLOGICAL FRACTURE, UNSPECIFIED OSTEOPOROSIS TYPE: ICD-10-CM

## 2022-07-14 DIAGNOSIS — Z13.820 ENCOUNTER FOR SCREENING FOR OSTEOPOROSIS: ICD-10-CM

## 2022-07-14 DIAGNOSIS — R93.7 ABNORMAL BONE DENSITY SCREENING: ICD-10-CM

## 2022-07-14 PROCEDURE — 77080 DXA BONE DENSITY AXIAL: CPT

## 2023-06-27 ENCOUNTER — HOSPITAL ENCOUNTER (OUTPATIENT)
Dept: RADIOLOGY | Facility: IMAGING CENTER | Age: 83
Discharge: HOME | End: 2023-06-27
Payer: MEDICARE

## 2023-06-27 DIAGNOSIS — G45.9 TIA (TRANSIENT ISCHEMIC ATTACK): ICD-10-CM

## 2023-06-27 DIAGNOSIS — G45.4 TRANSIENT GLOBAL AMNESIA: ICD-10-CM

## 2023-06-27 PROCEDURE — 70544 MR ANGIOGRAPHY HEAD W/O DYE: CPT

## 2023-06-27 PROCEDURE — G1004 CDSM NDSC: HCPCS

## 2023-06-27 PROCEDURE — 70549 MR ANGIOGRAPH NECK W/O&W/DYE: CPT

## 2023-07-05 ENCOUNTER — HOSPITAL ENCOUNTER (OUTPATIENT)
Dept: RADIOLOGY | Facility: IMAGING CENTER | Age: 83
Discharge: HOME | End: 2023-07-05
Payer: MEDICARE

## 2023-07-05 DIAGNOSIS — G45.4 TRANSIENT GLOBAL AMNESIA: ICD-10-CM

## 2023-07-05 PROCEDURE — 70551 MRI BRAIN STEM W/O DYE: CPT

## 2023-10-18 ENCOUNTER — HOSPITAL ENCOUNTER (OUTPATIENT)
Dept: RADIOLOGY | Facility: IMAGING CENTER | Age: 83
Discharge: HOME | End: 2023-10-18
Payer: MEDICARE

## 2023-10-18 VITALS — WEIGHT: 110 LBS | BODY MASS INDEX: 20.77 KG/M2 | HEIGHT: 61 IN

## 2023-10-18 DIAGNOSIS — Z12.31 VISIT FOR SCREENING MAMMOGRAM: ICD-10-CM

## 2023-10-18 DIAGNOSIS — R05.3 CHRONIC COUGH: ICD-10-CM

## 2023-10-18 PROCEDURE — 71250 CT THORAX DX C-: CPT

## 2023-10-18 PROCEDURE — 77067 SCR MAMMO BI INCL CAD: CPT

## 2023-10-18 PROCEDURE — 77063 BREAST TOMOSYNTHESIS BI: CPT

## 2023-11-01 ENCOUNTER — TELEPHONE (OUTPATIENT)
Age: 83
End: 2023-11-01

## 2023-11-01 NOTE — TELEPHONE ENCOUNTER
Caller: Jayda Barcenas     Doctor:     Reason for call: Patient would like a call back to schedule Physical therapy     Call back#: [phone]

## 2023-11-13 ENCOUNTER — HOSPITAL ENCOUNTER (OUTPATIENT)
Dept: RADIOLOGY | Facility: IMAGING CENTER | Age: 83
Discharge: HOME | End: 2023-11-13
Payer: MEDICARE

## 2023-11-13 DIAGNOSIS — M54.6 BACK PAIN OF THORACOLUMBAR REGION: ICD-10-CM

## 2023-11-13 DIAGNOSIS — M54.50 BACK PAIN OF THORACOLUMBAR REGION: ICD-10-CM

## 2023-11-13 PROCEDURE — 72110 X-RAY EXAM L-2 SPINE 4/>VWS: CPT

## 2023-11-13 PROCEDURE — 72072 X-RAY EXAM THORAC SPINE 3VWS: CPT

## 2024-01-29 ENCOUNTER — APPOINTMENT (EMERGENCY)
Dept: CT IMAGING | Facility: HOSPITAL | Age: 84
End: 2024-01-29
Payer: MEDICARE

## 2024-01-29 ENCOUNTER — HOSPITAL ENCOUNTER (EMERGENCY)
Facility: HOSPITAL | Age: 84
Discharge: HOME | End: 2024-01-29
Attending: EMERGENCY MEDICINE
Payer: MEDICARE

## 2024-01-29 ENCOUNTER — APPOINTMENT (EMERGENCY)
Dept: RADIOLOGY | Facility: HOSPITAL | Age: 84
End: 2024-01-29
Payer: MEDICARE

## 2024-01-29 VITALS
SYSTOLIC BLOOD PRESSURE: 173 MMHG | BODY MASS INDEX: 20.78 KG/M2 | DIASTOLIC BLOOD PRESSURE: 74 MMHG | HEART RATE: 68 BPM | OXYGEN SATURATION: 98 % | WEIGHT: 110 LBS | RESPIRATION RATE: 17 BRPM | TEMPERATURE: 97.8 F

## 2024-01-29 DIAGNOSIS — M25.551 RIGHT HIP PAIN: ICD-10-CM
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
## 2024-01-29 PROCEDURE — 97162 PT EVAL MOD COMPLEX 30 MIN: CPT

## 2024-01-29 PROCEDURE — 97112 NEUROMUSCULAR REEDUCATION: CPT

## 2024-01-29 PROCEDURE — 71101 X-RAY EXAM UNILAT RIBS/CHEST: CPT

## 2024-01-29 PROCEDURE — G1004 CDSM NDSC: HCPCS

## 2024-01-29 PROCEDURE — 99284 EMERGENCY DEPT VISIT MOD MDM: CPT | Performed by: EMERGENCY MEDICINE

## 2024-01-29 PROCEDURE — 99284 EMERGENCY DEPT VISIT MOD MDM: CPT

## 2024-01-29 PROCEDURE — 73030 X-RAY EXAM OF SHOULDER: CPT

## 2024-01-29 PROCEDURE — 73502 X-RAY EXAM HIP UNI 2-3 VIEWS: CPT

## 2024-01-29 PROCEDURE — 72192 CT PELVIS W/O DYE: CPT

## 2024-01-29 PROCEDURE — 96372 THER/PROPH/DIAG INJ SC/IM: CPT

## 2024-01-29 RX ORDER — KETOROLAC TROMETHAMINE 30 MG/ML
30 INJECTION, SOLUTION INTRAMUSCULAR; INTRAVENOUS ONCE
Status: COMPLETED | OUTPATIENT
Start: 2024-01-29 | End: 2024-01-29

## 2024-01-29 RX ORDER — ACETAMINOPHEN 325 MG/1
975 TABLET ORAL ONCE
Status: COMPLETED | OUTPATIENT
Start: 2024-01-29 | End: 2024-01-29

## 2024-01-29 RX ADMIN — KETOROLAC TROMETHAMINE 30 MG: 30 INJECTION, SOLUTION INTRAMUSCULAR; INTRAVENOUS at 13:54

## 2024-01-29 RX ADMIN — Medication 2.5 MG: at 13:55

## 2024-01-29 RX ADMIN — ACETAMINOPHEN 975 MG: 325 TABLET ORAL at 12:34

## 2024-01-29 NOTE — DISCHARGE INSTRUCTIONS
-Please use 600 mg ibuprofen 3 times a day and Tylenol 1000 mg 3 times a day for the next 3 days to help with symptoms.  Please return to care if you have any new or worsening symptoms.

## 2024-01-29 NOTE — PLAN OF CARE
Problem: PHYSICAL THERAPY ADULT  Goal: Performs mobility at highest level of function for planned discharge setting.  See evaluation for individualized goals.  Description: Treatment/Interventions: Functional transfer training, Elevations, Therapeutic exercise, LE strengthening/ROM, Endurance training, Patient/family training, Equipment eval/education, Gait training, Spoke to nursing, Spoke to case management  Equipment Recommended: Walker (patient has own)       See flowsheet documentation for full assessment, interventions and recommendations.  Note: Prognosis: Good  Problem List: Decreased strength, Decreased endurance, Impaired balance, Decreased mobility, Pain  Assessment: Pt is 83 y.o. female seen for PT evaluation s/p admit to  [facility] ED  on 1/29/2024 w/ fall in elderly patient. PT consulted to assess pt's functional mobility and d/c needs. Order placed for PT eval and tx order. Comorbidities affecting pt's physical performance at time of assessment include: weakness, fall,leg pain. PTA, pt was independent w/ all functional mobility w/ o AD usage . Personal factors affecting pt at time of IE include: ambulating w/ assistive device, stairs to enter home, inability to navigate community distances, unable to perform dynamic tasks in community, positive fall history, and inability to perform IADLs. Please find objective findings from PT assessment regarding body systems outlined above with impairments and limitations including weakness, impaired balance, decreased endurance, gait deviations, pain, decreased activity tolerance, and fall risk. The following objective measures performed on IE also reveal limitations: -PAC 6-Clicks: 18/24. From PT/mobility standpoint, recommendation at time of d/c would be of minimal resource intensity pending progress in order to facilitate return to PLOF        Rehab Resource Intensity Level, PT: III (Minimum Resource Intensity)    See flowsheet documentation  for full assessment.

## 2024-01-29 NOTE — PHYSICAL THERAPY NOTE
Physical Therapy Evaluation     Patient's Name: Moraima Rodas    Admitting Diagnosis  Shoulder injury [S49.90XA]  Fall in elderly patient [R29.6]    Problem List  Patient Active Problem List   Diagnosis    Magnetic resonance imaging of brain with filling defect left transverse and sigmoid sinuses       Past Medical History  Past Medical History:   Diagnosis Date    Arthritis     Constipation     at times when eating cheese    Coronary artery disease     Dizziness     GERD (gastroesophageal reflux disease)     occasional    Hemorrhoids     History of shingles     Hyperlipidemia     Myocardial infarction (HCC)     2005 silent- no problem since    Osteoporosis     Wears dentures     full upper, partial lower       Past Surgical History  Past Surgical History:   Procedure Laterality Date    BREAST EXCISIONAL BIOPSY Bilateral     2- lt side, 1 rt  all benign    BREAST MASS EXCISION      cysts x 3    COLONOSCOPY      unsuccessful attempt    COLONOSCOPY N/A 4/15/2019    Procedure: COLONOSCOPY;  Surgeon: Isaiah Richter MD;  Location: [facility];  Service: Colorectal    CORONARY ARTERY BYPASS GRAFT  2005    5 vessels    DILATION AND CURETTAGE OF UTERUS      LAPAROSCOPY      SHOULDER SURGERY Right         01/29/24 1317   PT Last Visit   PT Visit Date 01/29/24   Note Type   Note type Evaluation   Pain Assessment   Pain Assessment Tool 0-10  (denies at rest, increased with activity)   Pain Score 7   Pain Location/Orientation Orientation: Right;Location: Leg   Pain Onset/Description Onset: Ongoing;Frequency: Constant/Continuous;Descriptor: Aching;Descriptor: Sore   Effect of Pain on Daily Activities yes   Patient's Stated Pain Goal No pain   Hospital Pain Intervention(s) Repositioned;Ambulation/increased activity;Emotional support;Environmental changes;Elevated   Multiple Pain Sites No   Restrictions/Precautions   Weight Bearing Precautions Per Order No   Other Precautions Fall Risk;Pain;Multiple lines;Telemetry   Home Living   Type  "of Home House   Home Layout Two level;Bed/bath upstairs  (reports sleeps on 2nd floor due to heat, warmer on upper floor)   Bathroom Shower/Tub   (sponge bathing at baseline)   Bathroom Toilet   (commode usage on 1st floor)   Bathroom Equipment Commode   Bathroom Accessibility Accessible   Home Equipment Walker  (purchased a walker on 1/26/24)   Prior Function   Level of [place] Independent with functional mobility;Independent with ADLs;Independent with IADLS   Lives With Family  (sister)   Receives Help From Family  (prn)   IADLs Independent with medication management;Independent with meal prep;Independent with driving   Falls in the last 6 months 0  (\"tipped forward\" in bathroom)   Vocational Retired   General   Additional Pertinent History Moraima reports a back injury on November 4th 2023 and \"finally feeling better, more like myself\" and then sustained this fall onto floor.   Family/Caregiver Present Yes  (sister)   Cognition   Overall Cognitive Status WFL   Arousal/Participation Alert   Orientation Level Oriented X4   Memory Within functional limits   Following Commands Follows all commands and directions without difficulty   Comments Moraima was agreeable to PT assessment, pleasant.   RLE Assessment   RLE Assessment X  (3+/5 gross musculature)   LLE Assessment   LLE Assessment X  (3+/5 gross musculature)   Vision-Basic Assessment   Current Vision Wears glasses all the time   Vestibular   Spontaneous Nystagmus (-) no evidence of nystagmus at rest in room light   Gaze Holding Nystagmus (-) no evidence of nystagmus   Coordination   Movements are Fluid and Coordinated 1   Bed Mobility   Supine to Sit 6  Modified independent   Additional items HOB elevated;Bedrails   Sit to Supine 6  Modified independent   Additional items Bedrails;HOB elevated   Additional Comments No dizziness or lightheadedness noted with positional change.   Transfers   Sit to Stand 5  Supervision   Additional items Assist x 1;Bedrails;Verbal " cues  (RW utilization)   Stand to Sit 5  Supervision   Additional items Assist x 1;Bedrails;Verbal cues   Stand pivot 5  Supervision  (distant)   Additional items Assist x 1;Verbal cues   Additional Comments Verbal cues for proper BUE placement with transitional movements, safety while turning.   Ambulation/Elevation   Gait pattern Improper Weight shift;Antalgic;Decreased R stance;Short stride   Gait Assistance 5  Supervision   Additional items Assist x 1;Verbal cues   Assistive Device Rolling walker   Distance 30 feet x 2   Stair Management Assistance Not tested   Ambulation/Elevation Additional Comments Verbal cues for base of support widening and proper AD utilization.   Balance   Static Sitting Good   Dynamic Sitting Good   Static Standing Fair +   Dynamic Standing Fair   Ambulatory Fair   Endurance Deficit   Endurance Deficit Yes   Activity Tolerance   Activity Tolerance Patient limited by pain   Medical Staff Made Aware Yes, Dr. Lou and CM were informed of d/c disposition recommendation.   Nurse Made Aware yes, Alexandria RN   Assessment   Prognosis Good   Problem List Decreased strength;Decreased endurance;Impaired balance;Decreased mobility;Pain   Assessment Pt is 83 y.o. female seen for PT evaluation s/p admit to  [facility] ED  on 1/29/2024 w/ fall in elderly patient. PT consulted to assess pt's functional mobility and d/c needs. Order placed for PT eval and tx order. Comorbidities affecting pt's physical performance at time of assessment include: weakness, fall,leg pain. PTA, pt was independent w/ all functional mobility w/ o AD usage . Personal factors affecting pt at time of IE include: ambulating w/ assistive device, stairs to enter home, inability to navigate community distances, unable to perform dynamic tasks in community, positive fall history, and inability to perform IADLs. Please find objective findings from PT assessment regarding body systems outlined above with impairments and  limitations including weakness, impaired balance, decreased endurance, gait deviations, pain, decreased activity tolerance, and fall risk. The following objective measures performed on IE also reveal limitations: AM-PAC 6-Clicks: 18/24. From PT/mobility standpoint, recommendation at time of d/c would be of minimal resource intensity pending progress in order to facilitate return to PLOF   Goals   Patient Goals to have less pain in her leg   LTG Expiration Date 02/08/24   Long Term Goal #1 Patient will complete transfers modified I  to decrease risk of falls, facilitate upright standing posture. BLE strength to greater than/equal to 4/5 gross musculature to increase ability to safely transfer, control descent to chair.  Patient will exhibit increase dynamic standing balance to Good 3-4 minutes without LOB modified I to improve endurance. Patient will exhibit increase dynamic ambulatory balance to Good 350-500 feet w/ AD modified I to improve ability to mobilize to toilet, chair and decrease risk for additional medical complications. Patient will exhibit good self monitoring and ability to follow 2 step commands to increase complexity of tasks and resume ADL's without LOB.   PT Treatment Day 0   Plan   Treatment/Interventions Functional transfer training;Elevations;Therapeutic exercise;LE strengthening/ROM;Endurance training;Patient/family training;Equipment eval/education;Gait training;Spoke to nursing;Spoke to case management   PT Frequency 2-3x/wk   Discharge Recommendation   Rehab Resource Intensity Level, PT III (Minimum Resource Intensity)   Equipment Recommended Walker  (patient has own)   Walker Package Recommended Wheeled walker   Additional Comments Upon conclusion, Moraima was resting in bed with all needs within reach.   AM-PAC Basic Mobility Inpatient   Turning in Flat Bed Without Bedrails 3   Lying on Back to Sitting on Edge of Flat Bed Without Bedrails 3   Moving Bed to Chair 3   Standing Up From Chair  Using Arms 3   Walk in Room 3   Climb 3-5 Stairs With Railing 3   Basic Mobility Inpatient Raw Score 18   Basic Mobility Standardized Score 41.05   Highest Level Of Mobility   -HLM Goal 6: Walk 10 steps or more   JH-HLM Achieved 7: Walk 25 feet or more     Initial Assessment Time: [id number]    Johana Alvarado, PT

## 2024-01-29 NOTE — ED NOTES
Pt ambulated independently with walker in the hallway     Alexandria Rodriguez RN  01/29/24 0761

## 2024-01-29 NOTE — ED PROVIDER NOTES
History  Chief Complaint   Patient presents with    Fall     Patient arrives after a fall that occurred at 10am. States had fallen forward hitting right shoulder and right hip. Denies head strike.      Patient is an 83-year-old female without pertinent medical history presents for evaluation of right hip pain after a fall.  Patient says that she was hovering over the toilet at the doctor's office when she lost her balance and fell forward hitting her right shoulder and right hip.  Primary complaint plaint is right hip pain.  She is still been ambulatory despite her symptoms.  She denies taking anything for pain.  Also some mild right shoulder and right-sided rib pain.  She is adamant that she did not strike her head denies headache nausea vomiting altered mental status or focal neurologic deficit.  She is on 81 mg aspirin.  She otherwise denies dyspnea or abdominal pain.        Prior to Admission Medications   Prescriptions Last Dose Informant Patient Reported? Taking?   acetaminophen (TYLENOL) 650 mg CR tablet  Self Yes No   Sig: Take 650 mg by mouth every 8 (eight) hours as needed for mild pain   aspirin (ECOTRIN LOW STRENGTH) 81 mg EC tablet  Self Yes No   Sig: Take 81 mg by mouth daily   azelastine (ASTELIN) 0.1 % nasal spray  Self Yes No   Si spray into each nostril 2 (two) times a day Use in each nostril as directed   calcium carbonate-vitamin D (OSCAL-D) 500 mg-200 units per tablet  Self Yes No   Sig: Take 1 tablet by mouth daily with breakfast   calcium polycarbophil (FIBERCON) 625 mg tablet  Self Yes No   Sig: Take 625 mg by mouth daily   cholecalciferol (VITAMIN D3) 400 units tablet  Self Yes No   Sig: Take 400 Units by mouth daily   clopidogrel (PLAVIX) 75 mg tablet  Self Yes No   Sig: Take 75 mg by mouth daily   glucosamine-chondroitin 500-400 MG tablet  Self Yes No   Sig: Take 1 tablet by mouth 2 (two) times a day   guaiFENesin (MUCINEX) 600 mg 12 hr tablet  Self Yes No   Sig: Take 1,200 mg by mouth  every 12 (twelve) hours   ibandronate (BONIVA) 150 MG tablet  Self Yes No   Sig: Take 150 mg by mouth every 30 (thirty) days    losartan (COZAAR) 100 MG tablet  Self Yes No   Sig: Take 100 mg by mouth daily   meclizine (ANTIVERT) 12.5 MG tablet  Self Yes No   Sig: Take 12.5 mg by mouth 3 (three) times a day as needed for dizziness   metoprolol tartrate (LOPRESSOR) 25 mg tablet  Self Yes No   Sig: Take 25 mg by mouth 2 (two) times a day    mineral oil-hydrophilic petrolatum (AQUAPHOR) ointment  Self Yes No   Sig: Apply 1 application topically as needed for dry skin   niacin (NIASPAN) 500 mg CR tablet  Self Yes No   Sig: Take 500 mg by mouth daily    oxymetazoline (AFRIN) 0.05 % nasal spray  Self Yes No   Si sprays by Each Nare route 2 (two) times a day   polyethylene glycol-propylene glycol (SYSTANE) 0.4-0.3 %  Self Yes No   Sig: Administer 1 drop to both eyes every 3 (three) hours as needed (L eye 3x day, R 1x day)   risedronate (ACTONEL) 35 mg tablet  Self Yes No   Sig: Take 35 mg by mouth weekly before breakfast   simvastatin (ZOCOR) 40 mg tablet  Self Yes No   Sig: Take 40 mg by mouth daily at bedtime    vitamin E, tocopherol, 400 units capsule  Self Yes No   Sig: Take 400 Units by mouth daily      Facility-Administered Medications: None       Past Medical History:   Diagnosis Date    Arthritis     Constipation     at times when eating cheese    Coronary artery disease     Dizziness     GERD (gastroesophageal reflux disease)     occasional    Hemorrhoids     History of shingles     Hyperlipidemia     Myocardial infarction (HCC)      silent- no problem since    Osteoporosis     Wears dentures     full upper, partial lower       Past Surgical History:   Procedure Laterality Date    BREAST EXCISIONAL BIOPSY Bilateral     2- lt side, 1 rt  all benign    BREAST MASS EXCISION      cysts x 3    COLONOSCOPY      unsuccessful attempt    COLONOSCOPY N/A 4/15/2019    Procedure: COLONOSCOPY;  Surgeon: Isaiah Richter MD;   Location: [facility];  Service: Colorectal    CORONARY ARTERY BYPASS GRAFT  2005    5 vessels    DILATION AND CURETTAGE OF UTERUS      LAPAROSCOPY      SHOULDER SURGERY Right        Family History   Problem Relation Age of Onset    No Known Problems Mother     No Known Problems Father     No Known Problems Sister     Cancer Maternal Grandmother     No Known Problems Maternal Grandfather     No Known Problems Paternal Grandmother     No Known Problems Paternal Grandfather     Cancer Paternal Aunt      I have reviewed and agree with the history as documented.    E-Cigarette/Vaping    E-Cigarette Use Never User      E-Cigarette/Vaping Substances    Nicotine No     THC No     CBD No     Flavoring No     Other No     Unknown No      Social History     Tobacco Use    Smoking status: Never    Smokeless tobacco: Never   Vaping Use    Vaping status: Never Used   Substance Use Topics    Alcohol use: Never    Drug use: Never       Review of Systems   Constitutional:  Negative for fever.   HENT:  Negative for sore throat.    Respiratory:  Negative for shortness of breath.    Cardiovascular:  Negative for chest pain.   Gastrointestinal:  Negative for abdominal pain.   Genitourinary:  Negative for dysuria.   Musculoskeletal:  Negative for back pain.        Right shoulder pain, right rib pain, right hip pain   Skin:  Negative for rash.   Neurological:  Negative for light-headedness.   Psychiatric/Behavioral:  Negative for agitation.    All other systems reviewed and are negative.      Physical Exam  Physical Exam  Vitals reviewed.   Constitutional:       General: She is not in acute distress.     Appearance: She is well-developed.   HENT:      Head: Normocephalic.   Eyes:      Pupils: Pupils are equal, round, and reactive to light.   Cardiovascular:      Rate and Rhythm: Normal rate and regular rhythm.      Heart sounds: Normal heart sounds.   Pulmonary:      Effort: Pulmonary effort is normal.      Breath sounds: Normal breath  sounds.   Abdominal:      General: Bowel sounds are normal. There is no distension.      Palpations: Abdomen is soft.      Tenderness: There is no abdominal tenderness. There is no guarding.   Musculoskeletal:         General: Tenderness present. No deformity. Normal range of motion.      Cervical back: Normal range of motion and neck supple.      Comments: Right hip: Patient with mild tenderness of the right greater trochanter.  Distally neurovascular intact.  Full range of motion of the hip    Right shoulder: Full range of motion no tenderness distally neurovascular intact.    Mild tenderness to the right rib cage mid axillary line     Skin:     General: Skin is warm and dry.      Capillary Refill: Capillary refill takes less than 2 seconds.   Neurological:      Mental Status: She is alert and oriented to person, place, and time.      Cranial Nerves: No cranial nerve deficit.      Sensory: No sensory deficit.   Psychiatric:         Behavior: Behavior normal.         Thought Content: Thought content normal.         Judgment: Judgment normal.         Vital Signs  ED Triage Vitals [01/29/24 1137]   Temperature Pulse Respirations Blood Pressure SpO2   97.8 °F (36.6 °C) 72 18 (!) 179/97 98 %      Temp Source Heart Rate Source Patient Position - Orthostatic VS BP Location FiO2 (%)   Temporal Monitor Lying Right arm --      Pain Score       No Pain           Vitals:    01/29/24 1137 01/29/24 1236   BP: (!) 179/97 (!) 173/74   Pulse: 72 68   Patient Position - Orthostatic VS: Lying Sitting         Visual Acuity      ED Medications  Medications   acetaminophen (TYLENOL) tablet 975 mg (975 mg Oral Given 1/29/24 1234)   ketorolac (TORADOL) injection 30 mg (30 mg Intramuscular Given 1/29/24 1354)   oxyCODONE (ROXICODONE) split tablet 2.5 mg (2.5 mg Oral Given 1/29/24 1355)       Diagnostic Studies  Results Reviewed       None                   CT pelvis wo contrast   Final Result by Elder Ambrose MD (01/29 7450)      No  acute right hip fracture.         Workstation performed: [id number]         XR shoulder 2+ vw right    (Results Pending)   XR ribs with pa chest min 3 views RIGHT    (Results Pending)   XR hip/pelv 2-3 vws right if performed    (Results Pending)              Procedures  Procedures         ED Course  ED Course as of 01/29/24 1449   Mon Jan 29, 2024   1341 PT recommending home therapy.  Patient still feels like she has a lot of pain.  Will try dose of Toradol and oxycodone and see if she is more comfortable to go home.                               SBIRT 20yo+      Flowsheet Row Most Recent Value   Initial Alcohol Screen: US AUDIT-C     1. How often do you have a drink containing alcohol? 0 Filed at: 01/29/2024 1139   2. How many drinks containing alcohol do you have on a typical day you are drinking?  0 Filed at: 01/29/2024 1139   3a. Male UNDER 65: How often do you have five or more drinks on one occasion? 0 Filed at: 01/29/2024 1139   3b. FEMALE Any Age, or MALE 65+: How often do you have 4 or more drinks on one occassion? 0 Filed at: 01/29/2024 1139   Audit-C Score 0 Filed at: 01/29/2024 1139   SHELLEY: How many times in the past year have you...    Used an illegal drug or used a prescription medication for non-medical reasons? Never Filed at: 01/29/2024 1139                      Medical Decision Making  Patient is an 83-year-old female that comes with the right shoulder, right hip, right rib pain after a fall.  Patient with negative imaging here including x-rays and CTs that were reviewed.  Initially she felt like she was in a fair amount of pain and does not know if she can go home.  Patient was given a dose of Tylenol followed by low-dose oxycodone and Toradol with significant improvement of the pain.  Physical therapy believes that the patient could have physical therapy ordered for home and does not need to stay.  Patient in agreement the plan.    Amount and/or Complexity of Data Reviewed  Radiology:  ordered.    Risk  OTC drugs.  Prescription drug management.             Disposition  Final diagnoses:   Fall, initial encounter   Right hip pain     Time reflects when diagnosis was documented in both MDM as applicable and the Disposition within this note       Time User Action Codes Description Comment    1/29/2024  2:31 PM Patriciagwendolyn Tomas Add [W19.XXXA] Fall, initial encounter     1/29/2024  2:31 PM Carmine Tomas Add [M25.551] Right hip pain           ED Disposition       ED Disposition   Discharge    Condition   Stable    Date/Time   Mon Jan 29, 2024 1431    Comment   Moraima Rodas discharge to home/self care.                   Follow-up Information       Follow up With Specialties Details Why Contact Info Additional Information    [facility], [facility] Emergency Department Emergency Medicine  If symptoms worsen [address] [facility] [address] [phone] [facility], [facility] Emergency Department, [address] [facility], [place] [address]    Sasha Triplett MD Family Medicine Schedule an appointment as soon as possible for a visit   [address] [facility] [address] [phone]               Discharge Medication List as of 1/29/2024  2:31 PM        CONTINUE these medications which have NOT CHANGED    Details   acetaminophen (TYLENOL) 650 mg CR tablet Take 650 mg by mouth every 8 (eight) hours as needed for mild pain, Historical Med      aspirin (ECOTRIN LOW STRENGTH) 81 mg EC tablet Take 81 mg by mouth daily, Historical Med      azelastine (ASTELIN) 0.1 % nasal spray 1 spray into each nostril 2 (two) times a day Use in each nostril as directed, Historical Med      calcium carbonate-vitamin D (OSCAL-D) 500 mg-200 units per tablet Take 1 tablet by mouth daily with breakfast, Historical Med      calcium polycarbophil (FIBERCON) 625 mg tablet Take 625 mg by mouth daily, Historical Med      cholecalciferol (VITAMIN D3) 400 units tablet Take 400 Units by mouth daily,  Historical Med      clopidogrel (PLAVIX) 75 mg tablet Take 75 mg by mouth daily, Historical Med      glucosamine-chondroitin 500-400 MG tablet Take 1 tablet by mouth 2 (two) times a day, Historical Med      guaiFENesin (MUCINEX) 600 mg 12 hr tablet Take 1,200 mg by mouth every 12 (twelve) hours, Historical Med      ibandronate (BONIVA) 150 MG tablet Take 150 mg by mouth every 30 (thirty) days , Historical Med      losartan (COZAAR) 100 MG tablet Take 100 mg by mouth daily, Starting Wed 6/17/2020, Historical Med      meclizine (ANTIVERT) 12.5 MG tablet Take 12.5 mg by mouth 3 (three) times a day as needed for dizziness, Historical Med      metoprolol tartrate (LOPRESSOR) 25 mg tablet Take 25 mg by mouth 2 (two) times a day , Starting Thu 4/23/2015, Historical Med      mineral oil-hydrophilic petrolatum (AQUAPHOR) ointment Apply 1 application topically as needed for dry skin, Historical Med      niacin (NIASPAN) 500 mg CR tablet Take 500 mg by mouth daily , Starting Tue 7/14/2015, Historical Med      oxymetazoline (AFRIN) 0.05 % nasal spray 2 sprays by Each Nare route 2 (two) times a day, Historical Med      polyethylene glycol-propylene glycol (SYSTANE) 0.4-0.3 % Administer 1 drop to both eyes every 3 (three) hours as needed (L eye 3x day, R 1x day), Historical Med      risedronate (ACTONEL) 35 mg tablet Take 35 mg by mouth weekly before breakfast, Starting Thu 6/11/2020, Historical Med      simvastatin (ZOCOR) 40 mg tablet Take 40 mg by mouth daily at bedtime , Starting Thu 4/23/2015, Historical Med      vitamin E, tocopherol, 400 units capsule Take 400 Units by mouth daily, Historical Med                 PDMP Review       None            ED Provider  Electronically Signed by             Tomas Lou MD  01/29/24 7058

## 2024-02-01 ENCOUNTER — HOSPITAL ENCOUNTER (EMERGENCY)
Facility: HOSPITAL | Age: 84
Discharge: HOME | End: 2024-02-01
Attending: EMERGENCY MEDICINE
Payer: MEDICARE

## 2024-02-01 ENCOUNTER — APPOINTMENT (EMERGENCY)
Dept: CT IMAGING | Facility: HOSPITAL | Age: 84
End: 2024-02-01
Payer: MEDICARE

## 2024-02-01 ENCOUNTER — APPOINTMENT (EMERGENCY)
Dept: RADIOLOGY | Facility: HOSPITAL | Age: 84
End: 2024-02-01
Payer: MEDICARE

## 2024-02-01 VITALS
TEMPERATURE: 97.5 F | OXYGEN SATURATION: 94 % | SYSTOLIC BLOOD PRESSURE: 121 MMHG | DIASTOLIC BLOOD PRESSURE: 59 MMHG | HEART RATE: 79 BPM | RESPIRATION RATE: 16 BRPM

## 2024-02-01 DIAGNOSIS — S70.01XA CONTUSION OF RIGHT HIP, INITIAL ENCOUNTER: Primary | ICD-10-CM

## 2024-02-01 DIAGNOSIS — S20.211A RIB CONTUSION, RIGHT, INITIAL ENCOUNTER: ICD-10-CM

## 2024-02-01 DIAGNOSIS — S40.029A ARM CONTUSION: ICD-10-CM

## 2024-02-01 PROCEDURE — 74176 CT ABD & PELVIS W/O CONTRAST: CPT

## 2024-02-01 PROCEDURE — 99284 EMERGENCY DEPT VISIT MOD MDM: CPT

## 2024-02-01 PROCEDURE — 99284 EMERGENCY DEPT VISIT MOD MDM: CPT | Performed by: EMERGENCY MEDICINE

## 2024-02-01 PROCEDURE — G1004 CDSM NDSC: HCPCS

## 2024-02-01 PROCEDURE — 71250 CT THORAX DX C-: CPT

## 2024-02-01 PROCEDURE — 73060 X-RAY EXAM OF HUMERUS: CPT

## 2024-02-01 RX ORDER — IBUPROFEN 800 MG/1
800 TABLET ORAL EVERY 8 HOURS PRN
Qty: 21 TABLET | Refills: 0 | Status: SHIPPED | OUTPATIENT
Start: 2024-02-01

## 2024-02-01 RX ORDER — SENNOSIDES 8.6 MG
650 CAPSULE ORAL EVERY 8 HOURS PRN
Qty: 30 TABLET | Refills: 0 | Status: SHIPPED | OUTPATIENT
Start: 2024-02-01 | End: 2024-02-11

## 2024-02-01 RX ORDER — SENNOSIDES 8.6 MG
650 CAPSULE ORAL EVERY 8 HOURS PRN
Qty: 30 TABLET | Refills: 0 | Status: SHIPPED | OUTPATIENT
Start: 2024-02-01 | End: 2024-02-01

## 2024-02-01 RX ORDER — ACETAMINOPHEN 325 MG/1
975 TABLET ORAL ONCE
Status: COMPLETED | OUTPATIENT
Start: 2024-02-01 | End: 2024-02-01

## 2024-02-01 RX ADMIN — ACETAMINOPHEN 975 MG: 325 TABLET ORAL at 10:05

## 2024-02-01 NOTE — ED NOTES
Patient ambulated with walker to the bathroom.  Gait steady. Patient reports feeling better than when she arrived.    Provider notified.     Allison A Schoener, RN  02/01/24 0785

## 2024-02-01 NOTE — ED PROVIDER NOTES
"History  Chief Complaint   Patient presents with    Fall     Out of a recliner onto right side. No head strike     Patient is an 84 yo F who presents for evaluation of R hip pain, R rib pain and R arm pain status post fall.  Patient says that she was getting out of the recliner this morning, when the leg rest fell down and she fell to the ground.  She says she landed on her right hip and arm.  She denies hitting her head or losing consciousness.  She is on aspirin but no other blood thinners.  Patient was seen here on  for a fall as well.  She had a negative x-ray of the shoulder and ribs and had a negative CAT scan and x-ray of the right hip.  Patient says she is having persistent pain in the hip.  She says that she \"is not able to walk at all.\"  She is also complaining of some persistent right-sided rib pain.  She has new ecchymosis to the right humerus.        Prior to Admission Medications   Prescriptions Last Dose Informant Patient Reported? Taking?   acetaminophen (TYLENOL) 650 mg CR tablet  Self Yes No   Sig: Take 650 mg by mouth every 8 (eight) hours as needed for mild pain   aspirin (ECOTRIN LOW STRENGTH) 81 mg EC tablet  Self Yes No   Sig: Take 81 mg by mouth daily   azelastine (ASTELIN) 0.1 % nasal spray  Self Yes No   Si spray into each nostril 2 (two) times a day Use in each nostril as directed   calcium carbonate-vitamin D (OSCAL-D) 500 mg-200 units per tablet  Self Yes No   Sig: Take 1 tablet by mouth daily with breakfast   calcium polycarbophil (FIBERCON) 625 mg tablet  Self Yes No   Sig: Take 625 mg by mouth daily   cholecalciferol (VITAMIN D3) 400 units tablet  Self Yes No   Sig: Take 400 Units by mouth daily   clopidogrel (PLAVIX) 75 mg tablet  Self Yes No   Sig: Take 75 mg by mouth daily   glucosamine-chondroitin 500-400 MG tablet  Self Yes No   Sig: Take 1 tablet by mouth 2 (two) times a day   guaiFENesin (MUCINEX) 600 mg 12 hr tablet  Self Yes No   Sig: Take 1,200 mg by mouth every 12 " (twelve) hours   ibandronate (BONIVA) 150 MG tablet  Self Yes No   Sig: Take 150 mg by mouth every 30 (thirty) days    losartan (COZAAR) 100 MG tablet  Self Yes No   Sig: Take 100 mg by mouth daily   meclizine (ANTIVERT) 12.5 MG tablet  Self Yes No   Sig: Take 12.5 mg by mouth 3 (three) times a day as needed for dizziness   metoprolol tartrate (LOPRESSOR) 25 mg tablet  Self Yes No   Sig: Take 25 mg by mouth 2 (two) times a day    mineral oil-hydrophilic petrolatum (AQUAPHOR) ointment  Self Yes No   Sig: Apply 1 application topically as needed for dry skin   niacin (NIASPAN) 500 mg CR tablet  Self Yes No   Sig: Take 500 mg by mouth daily    oxymetazoline (AFRIN) 0.05 % nasal spray  Self Yes No   Si sprays by Each Nare route 2 (two) times a day   polyethylene glycol-propylene glycol (SYSTANE) 0.4-0.3 %  Self Yes No   Sig: Administer 1 drop to both eyes every 3 (three) hours as needed (L eye 3x day, R 1x day)   risedronate (ACTONEL) 35 mg tablet  Self Yes No   Sig: Take 35 mg by mouth weekly before breakfast   simvastatin (ZOCOR) 40 mg tablet  Self Yes No   Sig: Take 40 mg by mouth daily at bedtime    vitamin E, tocopherol, 400 units capsule  Self Yes No   Sig: Take 400 Units by mouth daily      Facility-Administered Medications: None       Past Medical History:   Diagnosis Date    Arthritis     Constipation     at times when eating cheese    Coronary artery disease     Dizziness     GERD (gastroesophageal reflux disease)     occasional    Hemorrhoids     History of shingles     Hyperlipidemia     Myocardial infarction (HCC)      silent- no problem since    Osteoporosis     Wears dentures     full upper, partial lower       Past Surgical History:   Procedure Laterality Date    BREAST EXCISIONAL BIOPSY Bilateral     2- lt side, 1 rt  all benign    BREAST MASS EXCISION      cysts x 3    COLONOSCOPY      unsuccessful attempt    COLONOSCOPY N/A 4/15/2019    Procedure: COLONOSCOPY;  Surgeon: Isaiah Richter MD;   Location: [facility];  Service: Colorectal    CORONARY ARTERY BYPASS GRAFT  2005    5 vessels    DILATION AND CURETTAGE OF UTERUS      LAPAROSCOPY      SHOULDER SURGERY Right        Family History   Problem Relation Age of Onset    No Known Problems Mother     No Known Problems Father     No Known Problems Sister     Cancer Maternal Grandmother     No Known Problems Maternal Grandfather     No Known Problems Paternal Grandmother     No Known Problems Paternal Grandfather     Cancer Paternal Aunt      I have reviewed and agree with the history as documented.    E-Cigarette/Vaping    E-Cigarette Use Never User      E-Cigarette/Vaping Substances    Nicotine No     THC No     CBD No     Flavoring No     Other No     Unknown No      Social History     Tobacco Use    Smoking status: Never    Smokeless tobacco: Never   Vaping Use    Vaping status: Never Used   Substance Use Topics    Alcohol use: Never    Drug use: Never       Review of Systems   Constitutional:  Negative for fever and unexpected weight change.   HENT:  Negative for congestion, ear pain, sore throat and trouble swallowing.    Eyes:  Negative for pain and redness.   Respiratory:  Negative for cough, chest tightness and shortness of breath.    Cardiovascular:  Negative for chest pain and leg swelling.   Gastrointestinal:  Negative for abdominal distention, abdominal pain, diarrhea and vomiting.   Endocrine: Negative for polyuria.   Genitourinary:  Negative for dysuria, hematuria, pelvic pain and vaginal bleeding.   Musculoskeletal:  Positive for arthralgias (R hip, R arm). Negative for back pain and myalgias.        R lateral rib pain     Skin:  Negative for rash.   Neurological:  Negative for dizziness, syncope, weakness, light-headedness and headaches.       Physical Exam  Physical Exam  Vitals and nursing note reviewed.   Constitutional:       General: She is not in acute distress.     Appearance: She is well-developed.   HENT:      Head: Normocephalic and  atraumatic.      Right Ear: External ear normal.      Left Ear: External ear normal.      Nose: Nose normal.      Mouth/Throat:      Mouth: Mucous membranes are moist.      Pharynx: No oropharyngeal exudate.   Eyes:      Conjunctiva/sclera: Conjunctivae normal.      Pupils: Pupils are equal, round, and reactive to light.   Cardiovascular:      Rate and Rhythm: Normal rate and regular rhythm.      Heart sounds: Normal heart sounds. No murmur heard.     No friction rub. No gallop.   Pulmonary:      Effort: Pulmonary effort is normal. No respiratory distress.      Breath sounds: Normal breath sounds. No wheezing or rales.   Chest:      Chest wall: Tenderness (R lateral ribs) present.   Abdominal:      General: There is no distension.      Palpations: Abdomen is soft.      Tenderness: There is no abdominal tenderness. There is no guarding.   Musculoskeletal:         General: Tenderness (R humerus) present. No swelling or deformity. Normal range of motion.      Cervical back: Normal range of motion and neck supple.      Comments: Ecchymosis to RUE  R hip tenderness       Lymphadenopathy:      Cervical: No cervical adenopathy.   Skin:     General: Skin is warm and dry.   Neurological:      General: No focal deficit present.      Mental Status: She is alert and oriented to person, place, and time. Mental status is at baseline.      Cranial Nerves: No cranial nerve deficit.      Sensory: No sensory deficit.      Motor: No weakness or abnormal muscle tone.      Coordination: Coordination normal.         Vital Signs  ED Triage Vitals [02/01/24 0948]   Temperature Pulse Respirations Blood Pressure SpO2   97.5 °F (36.4 °C) 87 16 137/71 97 %      Temp Source Heart Rate Source Patient Position - Orthostatic VS BP Location FiO2 (%)   Temporal Monitor Lying Left arm --      Pain Score       5           Vitals:    02/01/24 0948 02/01/24 0953 02/01/24 1145 02/01/24 1300   BP: 137/71 137/71 129/60 121/59   Pulse: 87 91 79 79   Patient  Position - Orthostatic VS: Lying Lying Lying Sitting         Visual Acuity  Visual Acuity      Flowsheet Row Most Recent Value   L Pupil Size (mm) 3   R Pupil Size (mm) 3            ED Medications  Medications   acetaminophen (TYLENOL) tablet 975 mg (975 mg Oral Given 2/1/24 1005)       Diagnostic Studies  Results Reviewed       None                   CT chest abdomen pelvis wo contrast   Final Result by Ann Marie Waldrop MD (02/01 1112)      No acute findings in the chest, abdomen or pelvis within the limits of unenhanced technique.   Small hiatal hernia.   Diverticulosis coli. No evidence of diverticulitis.   Nonobstructive right renal calculus.   Other chronic findings, as per the body of the report.               Workstation performed: [id number]         XR humerus RIGHT   ED Interpretation by Kyrie Clark DO (02/01 1117)   No acute osseous abnormality                  Procedures  Procedures         ED Course  ED Course as of 02/01/24 1447   Thu Feb 01, 2024   1232 Patient was able to ambulate in the department with a walker.   1241 Poke with patient.  Explained to her that there is no indication for inpatient PT OT at this time.  Will place a referral for home PT.  Told to take Tylenol and Motrin at home as she said the Tylenol helps.                               SBIRT 20yo+      Flowsheet Row Most Recent Value   Initial Alcohol Screen: US AUDIT-C     1. How often do you have a drink containing alcohol? 0 Filed at: 02/01/2024 0954   2. How many drinks containing alcohol do you have on a typical day you are drinking?  0 Filed at: 02/01/2024 0954   3a. Male UNDER 65: How often do you have five or more drinks on one occasion? 0 Filed at: 02/01/2024 0954   3b. FEMALE Any Age, or MALE 65+: How often do you have 4 or more drinks on one occassion? 0 Filed at: 02/01/2024 0954   Audit-C Score 0 Filed at: 02/01/2024 0954   SHELLEY: How many times in the past year have you...    Used an illegal drug or used a prescription  medication for non-medical reasons? Never Filed at: 02/01/2024 0954                      Medical Decision Making  83-year-old female presenting for evaluation of right hip pain, right rib pain after a fall.  Was seen here 1-29 for similar symptoms.  Had negative imaging at that time.  Patient denies head strike today.  No nausea, vomit, headache.  Is on aspirin.  Has tenderness to the right hip, right ribs, ecchymosis to right humerus  Given persistent right rib pain and negative x-ray will obtain CT chest rule out rib fractures.  Will obtain CT of right hip.  Will obtain x-ray of right humerus.  CT imaging shows no acute injury.  X-ray shows no proximal humerus fracture.  Patient able to ambulate in department.  Discharged with Tylenol and Motrin.  Referral was placed for home PT.    Problems Addressed:  Arm contusion: acute illness or injury  Contusion of right hip, initial encounter: acute illness or injury  Rib contusion, right, initial encounter: acute illness or injury    Amount and/or Complexity of Data Reviewed  Radiology: ordered and independent interpretation performed.    Risk  OTC drugs.  Prescription drug management.             Disposition  Final diagnoses:   Contusion of right hip, initial encounter   Rib contusion, right, initial encounter   Arm contusion     Time reflects when diagnosis was documented in both MDM as applicable and the Disposition within this note       Time User Action Codes Description Comment    2/1/2024 12:32 PM Kyrie Clark Add [S70.01XA] Contusion of right hip, initial encounter     2/1/2024 12:32 PM Kyrie Clark Add [S20.211A] Rib contusion, right, initial encounter     2/1/2024 12:32 PM Kyrie Clark Add [S40.029A] Arm contusion           ED Disposition       ED Disposition   Discharge    Condition   Stable    Date/Time   Thu Feb 1, 2024 1232    Comment   Moraima Rodas discharge to home/self care.                   Follow-up Information       Follow up With Specialties  Details Why Contact Info    Sasha Triplett MD Family Medicine Schedule an appointment as soon as possible for a visit  For follow up of hip pain [address]  [facility] [id number] [phone]              Discharge Medication List as of 2/1/2024  1:02 PM        START taking these medications    Details   ibuprofen (MOTRIN) 800 mg tablet Take 1 tablet (800 mg total) by mouth every 8 (eight) hours as needed for mild pain or moderate pain, Starting Thu 2/1/2024, Normal           CONTINUE these medications which have CHANGED    Details   !! acetaminophen (TYLENOL) 650 mg CR tablet Take 1 tablet (650 mg total) by mouth every 8 (eight) hours as needed for moderate pain or mild pain for up to 10 days, Starting Thu 2/1/2024, Until Sun 2/11/2024 at 2359, Normal       !! - Potential duplicate medications found. Please discuss with provider.        CONTINUE these medications which have NOT CHANGED    Details   !! acetaminophen (TYLENOL) 650 mg CR tablet Take 650 mg by mouth every 8 (eight) hours as needed for mild pain, Historical Med      aspirin (ECOTRIN LOW STRENGTH) 81 mg EC tablet Take 81 mg by mouth daily, Historical Med      azelastine (ASTELIN) 0.1 % nasal spray 1 spray into each nostril 2 (two) times a day Use in each nostril as directed, Historical Med      calcium carbonate-vitamin D (OSCAL-D) 500 mg-200 units per tablet Take 1 tablet by mouth daily with breakfast, Historical Med      calcium polycarbophil (FIBERCON) 625 mg tablet Take 625 mg by mouth daily, Historical Med      cholecalciferol (VITAMIN D3) 400 units tablet Take 400 Units by mouth daily, Historical Med      clopidogrel (PLAVIX) 75 mg tablet Take 75 mg by mouth daily, Historical Med      glucosamine-chondroitin 500-400 MG tablet Take 1 tablet by mouth 2 (two) times a day, Historical Med      guaiFENesin (MUCINEX) 600 mg 12 hr tablet Take 1,200 mg by mouth every 12 (twelve) hours, Historical Med      ibandronate (BONIVA) 150 MG tablet Take 150 mg  by mouth every 30 (thirty) days , Historical Med      losartan (COZAAR) 100 MG tablet Take 100 mg by mouth daily, Starting Wed 6/17/2020, Historical Med      meclizine (ANTIVERT) 12.5 MG tablet Take 12.5 mg by mouth 3 (three) times a day as needed for dizziness, Historical Med      metoprolol tartrate (LOPRESSOR) 25 mg tablet Take 25 mg by mouth 2 (two) times a day , Starting Thu 4/23/2015, Historical Med      mineral oil-hydrophilic petrolatum (AQUAPHOR) ointment Apply 1 application topically as needed for dry skin, Historical Med      niacin (NIASPAN) 500 mg CR tablet Take 500 mg by mouth daily , Starting Tue 7/14/2015, Historical Med      oxymetazoline (AFRIN) 0.05 % nasal spray 2 sprays by Each Nare route 2 (two) times a day, Historical Med      polyethylene glycol-propylene glycol (SYSTANE) 0.4-0.3 % Administer 1 drop to both eyes every 3 (three) hours as needed (L eye 3x day, R 1x day), Historical Med      risedronate (ACTONEL) 35 mg tablet Take 35 mg by mouth weekly before breakfast, Starting Thu 6/11/2020, Historical Med      simvastatin (ZOCOR) 40 mg tablet Take 40 mg by mouth daily at bedtime , Starting Thu 4/23/2015, Historical Med      vitamin E, tocopherol, 400 units capsule Take 400 Units by mouth daily, Historical Med       !! - Potential duplicate medications found. Please discuss with provider.              PDMP Review       None            ED Provider  Electronically Signed by             Kyrie Clark DO  02/01/24 3830

## 2024-08-15 ENCOUNTER — HOSPITAL ENCOUNTER (OUTPATIENT)
Dept: RADIOLOGY | Facility: IMAGING CENTER | Age: 84
End: 2024-08-15
Payer: MEDICARE

## 2024-08-15 DIAGNOSIS — M81.0 OSTEOPOROSIS, UNSPECIFIED OSTEOPOROSIS TYPE, UNSPECIFIED PATHOLOGICAL FRACTURE PRESENCE: ICD-10-CM

## 2024-08-15 PROCEDURE — 77080 DXA BONE DENSITY AXIAL: CPT

## 2025-06-14 NOTE — H&P (VIEW-ONLY)
Assessment/Plan:    Tyner did not improve on the nasal steroids.  Nasal endoscopy was completed on the last visit and it demonstrated no evidence of obstruction of the eustachian tube orifice on either side.  Audiometric evaluation today demonstrated significant conductive gap bilaterally.  We discussed the option of myringotomy with aspiration versus myringotomy with tube placement.  Unfortunately, she does not feel that she would be able to tolerate either in the office setting.  Based upon this I have set her up for bilateral myringotomy tube insertion in the OR setting.    Based upon the history given and the current physical findings, I have recommended that the patient undergo bilateral myringotomy tube insertion.  All risks, benefits, alternatives, and complications of the procedure have been reviewed in detail.  The risks of this surgery include, but are not limited to: bleeding, infection, early extrusion of the tubes, retention of the tubes and need for removal, otorrhea, tympanic membrane perforation, hearing loss, vertigo, tinnitus, and need for further surgery.  The patient / parent understands and accepts all risks of the surgery.  The surgery will be completed in the OR in the near future.  The patient will be given post-operative antibiotic ear drops after the procedure and a copy of the post-operative instructions has been given and reviewed with the patient / parent.  A post operative appointment has been made for the patient.  If any questions should arise prior to or after the surgery, the patient / parent has been instructed to call my office and I will be glad to discuss this with them.       Encounter Diagnosis     ICD-10-CM    1. Chronic dysfunction of both eustachian tubes  H69.93 ofloxacin (OCUFLOX) 0.3 % ophthalmic solution     Case request operating room: MYRINGOTOMY W/ INSERTION VENTILATION TUBE EAR     EKG 12 lead     Case request operating room: MYRINGOTOMY W/ INSERTION VENTILATION  TUBE EAR      2. Mixed conductive and sensorineural hearing loss of both ears  H90.6                    Patient ID: Moraima Rodas is a 85 y.o. female.    Moraima was seen by me for evaluation of bilateral middle ear effusions and associated loss of hearing.  She was very nervous in the office and would likely not tolerate any type of surgical procedure.  Rather than place her on oral steroids we use nasal steroid sprays and asked her to come back today.  She still does not have any significant improvement in her hearing.  Based upon her last visit I told her that if there was no improvement she would have a baseline audiogram to demonstrate any conductive hearing loss.    the following portions of the patient's history were reviewed and updated as appropriate: allergies, current medications, past family history, past medical history, past social history, past surgical history and problem list.      Past Medical History:   Diagnosis Date    Arthritis     Constipation     at times when eating cheese    Coronary artery disease     Dizziness     GERD (gastroesophageal reflux disease)     occasional    Hemorrhoids     History of shingles     Hyperlipidemia     Myocardial infarction (HCC)     2005 silent- no problem since    Osteoporosis     Wears dentures     full upper, partial lower       Past Surgical History:   Procedure Laterality Date    BREAST EXCISIONAL BIOPSY Bilateral     2- lt side, 1 rt  all benign    BREAST MASS EXCISION      cysts x 3    COLONOSCOPY      unsuccessful attempt    COLONOSCOPY N/A 4/15/2019    Procedure: COLONOSCOPY;  Surgeon: Isaiah Richter MD;  Location: [facility];  Service: Colorectal    CORONARY ARTERY BYPASS GRAFT  2005    5 vessels    DILATION AND CURETTAGE OF UTERUS      LAPAROSCOPY      SHOULDER SURGERY Right        Social History     Tobacco Use    Smoking status: Never    Smokeless tobacco: Never   Vaping Use    Vaping status: Never Used   Substance Use Topics    Alcohol use: Never    Drug  use: Never       Current Outpatient Medications on File Prior to Visit   Medication Sig Dispense Refill    acetaminophen (TYLENOL) 650 mg CR tablet Take 650 mg by mouth every 8 (eight) hours as needed for mild pain      aspirin (ECOTRIN LOW STRENGTH) 81 mg EC tablet Take 81 mg by mouth in the morning.      azelastine (ASTELIN) 0.1 % nasal spray 1 spray into each nostril 2 (two) times a day Use in each nostril as directed (Patient not taking: Reported on 5/19/2025)      calcium carbonate-vitamin D (OSCAL-D) 500 mg-200 units per tablet Take 1 tablet by mouth daily with breakfast (Patient not taking: Reported on 5/19/2025)      calcium polycarbophil (FIBERCON) 625 mg tablet Take 625 mg by mouth in the morning.      cholecalciferol (VITAMIN D3) 400 units tablet Take 400 Units by mouth in the morning.      clopidogrel (PLAVIX) 75 mg tablet Take 75 mg by mouth in the morning.      fluticasone (FLONASE) 50 mcg/act nasal spray 2 sprays into each nostril daily 16 g 11    glucosamine-chondroitin 500-400 MG tablet Take 1 tablet by mouth in the morning and 1 tablet in the evening.      guaiFENesin (MUCINEX) 600 mg 12 hr tablet Take 1,200 mg by mouth every 12 (twelve) hours      ibandronate (BONIVA) 150 MG tablet Take 150 mg by mouth every 30 (thirty) days      ibuprofen (MOTRIN) 800 mg tablet Take 1 tablet (800 mg total) by mouth every 8 (eight) hours as needed for mild pain or moderate pain 21 tablet 0    losartan (COZAAR) 100 MG tablet Take 100 mg by mouth in the morning.      meclizine (ANTIVERT) 12.5 MG tablet Take 12.5 mg by mouth as needed in the morning and 12.5 mg as needed at noon and 12.5 mg as needed in the evening for dizziness.      metoprolol tartrate (LOPRESSOR) 25 mg tablet Take 25 mg by mouth in the morning and 25 mg in the evening.      mineral oil-hydrophilic petrolatum (AQUAPHOR) ointment Apply 1 application. topically as needed for dry skin      niacin (NIASPAN) 500 mg CR tablet Take 500 mg by mouth in the  "morning.      oxymetazoline (AFRIN) 0.05 % nasal spray 2 sprays by Each Nare route in the morning and 2 sprays in the evening.      polyethylene glycol-propylene glycol (SYSTANE) 0.4-0.3 % Administer 1 drop to both eyes every 3 (three) hours as needed (L eye 3x day, R 1x day)      risedronate (ACTONEL) 35 mg tablet Take 35 mg by mouth weekly before breakfast      simvastatin (ZOCOR) 40 mg tablet Take 40 mg by mouth daily at bedtime      vitamin E, tocopherol, 400 units capsule Take 400 Units by mouth in the morning.       No current facility-administered medications on file prior to visit.       Allergies   Allergen Reactions    Other Itching     Itching in throat-per patient ragweed           Review of Systems   Constitutional:  Negative for activity change, appetite change, fatigue, fever and unexpected weight change.   HENT:  Positive for hearing loss. Negative for congestion, ear discharge, ear pain, nosebleeds, postnasal drip, rhinorrhea, sinus pressure, sinus pain, sneezing, sore throat, tinnitus, trouble swallowing and voice change.    Eyes: Negative.  Negative for photophobia, pain, itching and visual disturbance.   Respiratory: Negative.  Negative for cough, chest tightness and shortness of breath.    Cardiovascular: Negative.  Negative for chest pain.   Gastrointestinal: Negative.  Negative for abdominal pain.   Endocrine: Negative.    Musculoskeletal: Negative.  Negative for gait problem, neck pain and neck stiffness.   Skin: Negative.    Allergic/Immunologic: Negative.  Negative for environmental allergies.   Neurological: Negative.  Negative for dizziness, speech difficulty, light-headedness and headaches.   Hematological: Negative.  Negative for adenopathy.   Psychiatric/Behavioral: Negative.  Negative for sleep disturbance. The patient is not nervous/anxious.          /70   Pulse 71   Ht 5' 1\" (1.549 m)   Wt 48.5 kg (107 lb)   BMI 20.22 kg/m²       PHYSICAL  EXAMINATION    CONSTITUTION:  "   Appears appropriate for age.    No evidence of any acute distress.    Communicates normally.    Voice quality is clear.    Alert and oriented.    HEAD/FACE:    Atraumatic, normocephalic on inspection.    No scars present.    Salivary glands are normal in texture and size without any asymmetry.    Facial nerve function is symmetric and normal.    EYES:    Extraocular muscles intact in both eyes, normal gaze bilaterally and no evidence of nystagmus.    Pupils equal, round, and accommodate to light bilaterally.    EARS:    External ears normal.    External canals are clear and dry.  Tympanic membranes intact with clear middle ear effusions and decreased mobility.  Post auricular area is normal    NOSE:    External nose without deformity.    Internal mucosa pink and moist.    Septum midline.    Inferior nasal turbinates normal in color and size bilaterally    ORAL CAVITY:    Lips normal and healthy in appearance.    Full upper dentures and no lower molars.    Gums healthy, pink and moist.    Tongue appears pink and moist with no lesions.    Floor of mouth pink, moist, and smooth.    Submandibular ducts patent with clear saliva.    Parotid ducts patent with clear saliva.    Oral mucosa pink and moist.    Hard palate normal in appearance without any lesions.    OROPHARYNX:    Soft palate pink and moist without any lesions.    Uvula midline without any lesions.    Tonsils grade 1 bilaterally.    Posterior pharynx pink and moist without any lesions    NECK:    Supple and symmetric.    No masses noted.    Trachea midline.    No thyromegaly or nodules noted.    LYMPH:    No palpable adenopathy in left or right neck    SKIN:    No rashes. No lesions noted.     HEART:   Regular rate and rhythm    LUNGS:  Clear to auscultation bilaterally      The following audiological testing is performed by Afshan Dee, Audiologist    AUDIOGRAM:  Mild to profound mixed hearing loss, bilaterally.     SRT:  Left 80 dB, Right 60  dB    WORD RECOGNITION SCORE:  Left 44 %, Right 64 %    TYMPANOGRAM:  Left type B, Right type B

## 2025-06-23 ENCOUNTER — OFFICE VISIT (OUTPATIENT)
Dept: LAB | Facility: HOSPITAL | Age: 85
End: 2025-06-23
Attending: OTOLARYNGOLOGY
Payer: MEDICARE

## 2025-06-23 DIAGNOSIS — H69.93 CHRONIC DYSFUNCTION OF BOTH EUSTACHIAN TUBES: ICD-10-CM

## 2025-06-23 LAB
ATRIAL RATE: 66 BPM
P AXIS: 34 DEGREES
PR INTERVAL: 154 MS
QRS AXIS: -9 DEGREES
QRSD INTERVAL: 128 MS
QT INTERVAL: 432 MS
QTC INTERVAL: 453 MS
T WAVE AXIS: 90 DEGREES
VENTRICULAR RATE: 66 BPM

## 2025-06-23 PROCEDURE — 93005 ELECTROCARDIOGRAM TRACING: CPT

## 2025-06-23 PROCEDURE — 93010 ELECTROCARDIOGRAM REPORT: CPT | Performed by: INTERNAL MEDICINE

## 2025-06-25 LAB
ATRIAL RATE: 67 BPM
P AXIS: 38 DEGREES
PR INTERVAL: 160 MS
QRS AXIS: -11 DEGREES
QRSD INTERVAL: 132 MS
QT INTERVAL: 438 MS
QTC INTERVAL: 463 MS
T WAVE AXIS: 73 DEGREES
VENTRICULAR RATE: 67 BPM

## 2025-06-25 PROCEDURE — 93010 ELECTROCARDIOGRAM REPORT: CPT | Performed by: INTERNAL MEDICINE

## 2025-07-01 RX ORDER — RIBOFLAVIN (VITAMIN B2) 100 MG
1 TABLET ORAL 3 TIMES DAILY
COMMUNITY

## 2025-07-01 RX ORDER — APIXABAN 2.5 MG/1
2.5 TABLET, FILM COATED ORAL 2 TIMES DAILY
COMMUNITY

## 2025-07-01 RX ORDER — FAMOTIDINE 20 MG/1
20 TABLET, FILM COATED ORAL 2 TIMES DAILY
COMMUNITY

## 2025-07-01 RX ORDER — OMEGA-3/DHA/EPA/FISH OIL 60 MG-90MG
CAPSULE ORAL
COMMUNITY

## 2025-07-01 RX ORDER — MULTIVITAMIN
1 TABLET ORAL DAILY
COMMUNITY

## 2025-07-01 RX ORDER — HYDROCHLOROTHIAZIDE 25 MG/1
1 TABLET ORAL DAILY
COMMUNITY
Start: 2025-06-09

## 2025-07-01 NOTE — PRE-PROCEDURE INSTRUCTIONS
Pre-Surgery Instructions:   Medication Instructions    acetaminophen (TYLENOL) 650 mg CR tablet Uses PRN- OK to take day of surgery    calcium carbonate-vitamin D (OSCAL-D) 500 mg-200 units per tablet Hold day of surgery.    calcium polycarbophil (FIBERCON) 625 mg tablet Hold day of surgery.    cholecalciferol (VITAMIN D3) 400 units tablet Hold day of surgery.    denosumab (PROLIA) 60 mg/mL Q 6 months    Eliquis 2.5 MG Take day of surgery. No med holds per surgeon    famotidine (PEPCID) 20 mg tablet Take day of surgery.    fluticasone (FLONASE) 50 mcg/act nasal spray Uses PRN- OK to take day of surgery    glucosamine-chondroitin 500-400 MG tablet Hold day of surgery.    guaiFENesin (MUCINEX) 600 mg 12 hr tablet Uses PRN- OK to take day of surgery    hydroCHLOROthiazide 25 mg tablet Hold day of surgery.    ibuprofen (MOTRIN) 800 mg tablet Uses PRN- DO NOT take day of surgery    losartan (COZAAR) 100 MG tablet Hold day of surgery.    meclizine (ANTIVERT) 12.5 MG tablet Uses PRN- OK to take day of surgery    metoprolol tartrate (LOPRESSOR) 25 mg tablet Take day of surgery.    Multiple Vitamin (multivitamin) tablet Hold day of surgery.    niacin (NIASPAN) 500 mg CR tablet Take night before surgery    Omega-3 Fatty Acids (Fish Oil) 500 MG CAPS Hold day of surgery.    polyethylene glycol-propylene glycol (SYSTANE) 0.4-0.3 % Uses PRN- OK to take day of surgery    simvastatin (ZOCOR) 40 mg tablet Take day of surgery.    vitamin E, tocopherol, 400 units capsule Hold day of surgery.     Spoke with pt via phone.    Medication instructions for day of surgery reviewed. Please take all instructed medications with only a sip of water. Please do not take any over the counter (non-prescribed) vitamins or supplements for one week prior to date of surgery.      You will receive a call one business day prior to surgery with an arrival time and hospital directions. If your surgery is scheduled on a Monday, the hospital will be calling  you on the Friday prior to your surgery. If you have not heard from anyone by 8pm, please call the hospital supervisor through the hospital  at [phone]. ([place] [phone] or [place] [phone]).    Do not eat or drink anything after midnight the night before your surgery, including candy, mints, lifesavers, or chewing gum. Do not drink alcohol 24hrs before your surgery. Try not to smoke at least 24hrs before your surgery.       Follow the pre surgery showering instructions as listed in the “My Surgical Experience Booklet” or otherwise provided by your surgeon's office. Do not use a blade to shave the surgical area 1 week before surgery. It is okay to use a clean electric clippers up to 24 hours before surgery. Do not apply any lotions, creams, including makeup, cologne, deodorant, or perfumes after showering on the day of your surgery. Do not use dry shampoo, hair spray, hair gel, or any type of hair products.     No contact lenses, eye make-up, or artificial eyelashes. Remove nail polish, including gel polish, and any artificial, gel, or acrylic nails if possible. Remove all jewelry including rings and body piercing jewelry.     Wear causal clothing that is easy to take on and off. Consider your type of surgery.    Keep any valuables, jewelry, piercings at home. Please bring any specially ordered equipment (sling, braces) if indicated.    Arrange for a responsible person to drive you to and from the hospital on the day of your surgery. Please confirm the visitor policy for the day of your procedure when you receive your phone call with an arrival time.     Call the surgeon's office with any new illnesses, exposures, or additional questions prior to surgery.    Please reference your “My Surgical Experience Booklet” for additional information to prepare for your upcoming surgery.

## 2025-07-11 ENCOUNTER — ANESTHESIA EVENT (OUTPATIENT)
Dept: PERIOP | Facility: HOSPITAL | Age: 85
End: 2025-07-11
Payer: MEDICARE

## 2025-07-11 ENCOUNTER — ANESTHESIA (OUTPATIENT)
Dept: PERIOP | Facility: HOSPITAL | Age: 85
End: 2025-07-11
Payer: MEDICARE

## 2025-07-11 ENCOUNTER — HOSPITAL ENCOUNTER (OUTPATIENT)
Facility: HOSPITAL | Age: 85
Setting detail: OUTPATIENT SURGERY
Discharge: HOME | End: 2025-07-11
Attending: OTOLARYNGOLOGY | Admitting: OTOLARYNGOLOGY
Payer: MEDICARE

## 2025-07-11 VITALS
SYSTOLIC BLOOD PRESSURE: 149 MMHG | OXYGEN SATURATION: 99 % | TEMPERATURE: 98 F | RESPIRATION RATE: 20 BRPM | HEIGHT: 61 IN | BODY MASS INDEX: 20.2 KG/M2 | DIASTOLIC BLOOD PRESSURE: 86 MMHG | WEIGHT: 107 LBS | HEART RATE: 80 BPM

## 2025-07-11 PROBLEM — I21.9 MI (MYOCARDIAL INFARCTION) (HCC): Status: ACTIVE | Noted: 2025-07-11

## 2025-07-11 PROBLEM — Z95.1 HISTORY OF CORONARY ARTERY BYPASS GRAFT: Status: ACTIVE | Noted: 2025-07-11

## 2025-07-11 PROBLEM — I48.91 ATRIAL FIBRILLATION (HCC): Status: ACTIVE | Noted: 2025-07-11

## 2025-07-11 PROBLEM — I63.9 CVA (CEREBRAL VASCULAR ACCIDENT) (HCC): Status: ACTIVE | Noted: 2025-07-11

## 2025-07-11 PROBLEM — K21.9 GASTROESOPHAGEAL REFLUX DISEASE: Status: ACTIVE | Noted: 2025-07-11

## 2025-07-11 PROCEDURE — 69436 CREATE EARDRUM OPENING: CPT | Performed by: OTOLARYNGOLOGY

## 2025-07-11 DEVICE — IMPLANTABLE DEVICE: Type: IMPLANTABLE DEVICE | Site: EAR | Status: FUNCTIONAL

## 2025-07-11 RX ORDER — PHENYLEPHRINE HCL IN 0.9% NACL 1 MG/10 ML
SYRINGE (ML) INTRAVENOUS AS NEEDED
Status: DISCONTINUED | OUTPATIENT
Start: 2025-07-11 | End: 2025-07-11

## 2025-07-11 RX ORDER — SODIUM CHLORIDE, SODIUM LACTATE, POTASSIUM CHLORIDE, CALCIUM CHLORIDE 600; 310; 30; 20 MG/100ML; MG/100ML; MG/100ML; MG/100ML
INJECTION, SOLUTION INTRAVENOUS CONTINUOUS PRN
Status: DISCONTINUED | OUTPATIENT
Start: 2025-07-11 | End: 2025-07-11

## 2025-07-11 RX ORDER — FENTANYL CITRATE 50 UG/ML
INJECTION, SOLUTION INTRAMUSCULAR; INTRAVENOUS AS NEEDED
Status: DISCONTINUED | OUTPATIENT
Start: 2025-07-11 | End: 2025-07-11

## 2025-07-11 RX ORDER — PROPOFOL 10 MG/ML
INJECTION, EMULSION INTRAVENOUS CONTINUOUS PRN
Status: DISCONTINUED | OUTPATIENT
Start: 2025-07-11 | End: 2025-07-11

## 2025-07-11 RX ORDER — MAGNESIUM HYDROXIDE 1200 MG/15ML
LIQUID ORAL AS NEEDED
Status: DISCONTINUED | OUTPATIENT
Start: 2025-07-11 | End: 2025-07-11 | Stop reason: HOSPADM

## 2025-07-11 RX ORDER — ONDANSETRON 2 MG/ML
4 INJECTION INTRAMUSCULAR; INTRAVENOUS EVERY 6 HOURS PRN
Status: DISCONTINUED | OUTPATIENT
Start: 2025-07-11 | End: 2025-07-11 | Stop reason: HOSPADM

## 2025-07-11 RX ORDER — SODIUM CHLORIDE 9 MG/ML
125 INJECTION, SOLUTION INTRAVENOUS CONTINUOUS
Status: DISCONTINUED | OUTPATIENT
Start: 2025-07-11 | End: 2025-07-11 | Stop reason: HOSPADM

## 2025-07-11 RX ORDER — FENTANYL CITRATE/PF 50 MCG/ML
25 SYRINGE (ML) INJECTION
Status: DISCONTINUED | OUTPATIENT
Start: 2025-07-11 | End: 2025-07-11 | Stop reason: HOSPADM

## 2025-07-11 RX ORDER — ONDANSETRON 2 MG/ML
4 INJECTION INTRAMUSCULAR; INTRAVENOUS ONCE AS NEEDED
Status: DISCONTINUED | OUTPATIENT
Start: 2025-07-11 | End: 2025-07-11 | Stop reason: HOSPADM

## 2025-07-11 RX ADMIN — FENTANYL CITRATE 25 MCG: 50 INJECTION INTRAMUSCULAR; INTRAVENOUS at 10:33

## 2025-07-11 RX ADMIN — PROPOFOL 100 MCG/KG/MIN: 10 INJECTION, EMULSION INTRAVENOUS at 10:31

## 2025-07-11 RX ADMIN — PROPOFOL 30 MG: 10 INJECTION, EMULSION INTRAVENOUS at 10:32

## 2025-07-11 RX ADMIN — FENTANYL CITRATE 25 MCG: 50 INJECTION INTRAMUSCULAR; INTRAVENOUS at 10:31

## 2025-07-11 RX ADMIN — SODIUM CHLORIDE, SODIUM LACTATE, POTASSIUM CHLORIDE, AND CALCIUM CHLORIDE: .6; .31; .03; .02 INJECTION, SOLUTION INTRAVENOUS at 10:16

## 2025-07-11 RX ADMIN — Medication 100 MCG: at 10:34

## 2025-07-11 NOTE — DISCHARGE INSTR - AVS FIRST PAGE
[facility]     POST OPERATIVE TYMPANOTOMY INSTRUCTIONS      1. Keep water out of ears to avoid infection.    2.  If you have drainage of pus or blood that last more than a few minutes, severe pain unrelieved by medication, or a fever of 101°F or over, please call our office immediately at   [phone] or [phone].    3.  You will be discharged with a prescription or sample of an antibiotic eardrop. Use 4 drops in the operated ear(s) 2 times daily for 3 days.  In some cases you may be directed to use the medication for a longer period of time - surgeon will notify you if this is expected.      4.  No Smoking.  Avoid second hand smoke exposure.    5.  Your post operative visit has been scheduled:

## 2025-07-11 NOTE — OP NOTE
OPERATIVE REPORT  PATIENT NAME: Moraima Rodas    :  1940  MRN: [id number]  Pt Location: [facility] OR ROOM 02    SURGERY DATE: 2025    Surgeons and Role:     * Justo Mcgrath DO - Primary    Preop Diagnosis:  Chronic dysfunction of both eustachian tubes [H69.93]    Post-Op Diagnosis Codes:     * Chronic dysfunction of both eustachian tubes [H69.93]    Procedure(s):  Bilateral - MYRINGOTOMY W/ INSERTION VENTILATION TUBE EAR    Specimen(s):  * No specimens in log *    Estimated Blood Loss:   Minimal    Drains:  * No LDAs found *    Anesthesia Type:   Choice    Operative Indications:  Chronic dysfunction of both eustachian tubes [H69.93]      Operative Findings:  Bilateral effusions       Complications:   None    Procedure and Technique:  Patient was identified in the holding area and taken to the OR.  The patient was placed on the OR table in supine position.  General mask anesthesia was given to the patient.  Time out was obtained and surgeon, OR staff and anesthesia all agreed that information was correct.  The left ear was identified and an aural speculum placed in the ear canal.  Using the operating microscope the ear canal was evaluated.  Any cerumen was removed using a cerumen loop.  Removal of the skin caused canal hematoma.  The tympanic membrane was noted to be intact. The middle ear space demonstrated clear middle ear effusion.  An incision was made with a myringotomy knife in the anterior-inferior membrane.  Suction was used to remove any middle ear effusion.  I then placed a Moretz ventilation tube through the myringotomy site without any difficulty.  Topical antibiotic drops were then placed in the ear canal. A piece of cotton was placed in the ear canal.  The right ear was identified and an aural speculum placed in the ear canal.  Using the operating microscope the ear canal was evaluated.  Any cerumen was removed using a cerumen loop.  This caused a canal hematoma.  The tympanic membrane was  noted to be intact. The middle ear space demonstrated clear middle ear effusion.  An incision was made with a myringotomy knife in the anterior-inferior membrane.  Suction was used to remove any middle ear effusion.  I then placed a Moretz ventilation tube through the myringotomy site without any difficulty.  Topical antibiotic drops were then placed in the ear canal. A piece of cotton was placed in the ear canal.  At the completion of the case the patient was awoken and taken to the PACU in stable condition.    I was present for the entire procedure.    Patient Disposition:  PACU          SIGNATURE: Justo Mcgrath DO  DATE: July 11, 2025  TIME: 10:43 AM                 V-Y Plasty Text: The defect edges were debeveled with a #15 scalpel blade.  Given the location of the defect, shape of the defect and the proximity to free margins an V-Y advancement flap was deemed most appropriate.  Using a sterile surgical marker, an appropriate advancement flap was drawn incorporating the defect and placing the expected incisions within the relaxed skin tension lines where possible.    The area thus outlined was incised deep to adipose tissue with a #15 scalpel blade.  The skin margins were undermined to an appropriate distance in all directions utilizing iris scissors.

## 2025-07-11 NOTE — ANESTHESIA POSTPROCEDURE EVALUATION
Post-Op Assessment Note    CV Status:  Stable    Pain management: adequate       Mental Status:  Alert and awake   Hydration Status:  Euvolemic   PONV Controlled:  Controlled   Airway Patency:  Patent     Post Op Vitals Reviewed: Yes    No anethesia notable event occurred.    Staff: Anesthesiologist         Last Filed PACU Vitals:  Vitals Value Taken Time   Temp 98 °F (36.7 °C) 07/11/25 10:48   Pulse 63 07/11/25 11:15   /70 07/11/25 11:15   Resp 18 07/11/25 11:15   SpO2 95 % 07/11/25 11:15       Modified Shraddha:     Vitals Value Taken Time   Activity 2 07/11/25 11:15   Respiration 2 07/11/25 11:15   Circulation 2 07/11/25 11:15   Consciousness 2 07/11/25 11:15   Oxygen Saturation 2 07/11/25 11:15     Modified Shraddha Score: 10

## 2025-07-11 NOTE — ANESTHESIA POSTPROCEDURE EVALUATION
Post-Op Assessment Note    CV Status:  Stable  Pain Score: 0    Pain management: adequate       Mental Status:  Arousable   Hydration Status:  Stable   PONV Controlled:  None   Airway Patency:  Patent     Post Op Vitals Reviewed: Yes    No anethesia notable event occurred.            Last Filed PACU Vitals:  Vitals Value Taken Time   Temp 98.0    Pulse 75 07/11/25 10:49   /65 07/11/25 10:48   Resp 25 07/11/25 10:49   SpO2 100 % 07/11/25 10:49   Vitals shown include unfiled device data.

## 2025-07-11 NOTE — ANESTHESIA PREPROCEDURE EVALUATION
Procedure:  MYRINGOTOMY W/ INSERTION VENTILATION TUBE EAR (Bilateral: Ear)    Relevant Problems   ANESTHESIA (within normal limits)      CARDIO   (+) Atrial fibrillation (HCC)   (+) History of coronary artery bypass graft (x5 in 2005)   (-) Angina at rest (HCC)   (-) Angina of effort (HCC)   (-) BENITEZ (dyspnea on exertion)      ENDO (within normal limits)      GI/HEPATIC   (+) Gastroesophageal reflux disease (No n/v)      /RENAL (within normal limits)      GYN (within normal limits)      HEMATOLOGY (within normal limits)      MUSCULOSKELETAL (within normal limits)      NEURO/PSYCH   (+) CVA (cerebral vascular accident) (HCC) (2024. No residual weakness)   (-) Aphasia      PULMONARY (within normal limits)       Physical Exam    Airway     Mallampati score: III  TM Distance: >3 FB        Cardiovascular  Rhythm: regular, Rate: normal    Dental        Pulmonary   Breath sounds clear to auscultation, No rhonchi, Decreased breath sounds, No wheezes    Neurological    She appears awake, alert and oriented x3.      Other Findings  post-pubertal.    3/2024 TTE  •  Left Ventricle: Left ventricle is normal in size. Wall thickness is   normal. Systolic function is hyperdynamic with an ejection fraction over   65%.  Chordal LINNEA. Wall motion is within normal limits. There is grade I   (mild) diastolic dysfunction. There is abnormal septal motion consistent   with left bundle branch block.   •  Right Ventricle: Systolic function is mildly reduced.   •  Left Atrium: The bubble study is negative. Quality is fair.   •  Aortic Valve: The leaflets are mildly thickened. The leaflets are   calcified. There is mild stenosis.   •  Mitral Valve: There is mild annular calcification.   •  Tricuspid Valve: There is mild regurgitation.   •  IVC/SVC: The inferior vena cava demonstrates a diameter of <=21 mm and   collapses <50%; therefore, the right atrial pressure is estimated at 5-10   mmHg.   Anesthesia Plan  ASA Score- 3     Anesthesia Type-  IV sedation with anesthesia with ASA Monitors.         Additional Monitors:     Airway Plan: natural airway.           Plan Factors-Exercise tolerance (METS): >4 METS.    Chart reviewed. EKG reviewed.   Patient summary reviewed.    Patient is not a current smoker.      Obstructive sleep apnea risk education given perioperatively.        Induction- intravenous.    Postoperative Plan- .   Monitoring Plan - Monitoring plan - standard ASA monitoring      Perioperative Resuscitation Plan - Level 1 - Full Code.       Informed Consent- Anesthetic plan and risks discussed with patient.  I personally reviewed this patient with the CRNA. Discussed and agreed on the Anesthesia Plan with the CRNA..      NPO Status:  Vitals Value Taken Time   Date of last liquid 07/10/25 07/11/25 09:59   Time of last liquid 2200 07/11/25 09:59   Date of last solid 07/10/25 07/11/25 09:59   Time of last solid 1800 07/11/25 09:59

## (undated) DEVICE — SLEEVE SCD KNEE MEDIUM

## (undated) DEVICE — Device

## (undated) DEVICE — SPONGE GAUZE 4 X 4 16 PLY STRL PLASTIC TRAY LF